# Patient Record
Sex: FEMALE | Race: WHITE | Employment: UNEMPLOYED | URBAN - METROPOLITAN AREA
[De-identification: names, ages, dates, MRNs, and addresses within clinical notes are randomized per-mention and may not be internally consistent; named-entity substitution may affect disease eponyms.]

---

## 2017-05-04 ENCOUNTER — OFFICE VISIT (OUTPATIENT)
Dept: BARIATRICS/WEIGHT MGMT | Age: 32
End: 2017-05-04

## 2017-05-04 VITALS
SYSTOLIC BLOOD PRESSURE: 125 MMHG | HEART RATE: 88 BPM | DIASTOLIC BLOOD PRESSURE: 73 MMHG | BODY MASS INDEX: 45.99 KG/M2 | HEIGHT: 67 IN | WEIGHT: 293 LBS

## 2017-05-04 DIAGNOSIS — I47.1 SVT (SUPRAVENTRICULAR TACHYCARDIA) (HCC): ICD-10-CM

## 2017-05-04 DIAGNOSIS — R00.2 PALPITATIONS: ICD-10-CM

## 2017-05-04 DIAGNOSIS — Z01.810 PREOP CARDIOVASCULAR EXAM: ICD-10-CM

## 2017-05-04 DIAGNOSIS — R06.83 SNORES: ICD-10-CM

## 2017-05-04 DIAGNOSIS — K59.09 CHRONIC CONSTIPATION: ICD-10-CM

## 2017-05-04 DIAGNOSIS — G89.29 CHRONIC BILATERAL LOW BACK PAIN WITH LEFT-SIDED SCIATICA: ICD-10-CM

## 2017-05-04 DIAGNOSIS — R07.89 OTHER CHEST PAIN: ICD-10-CM

## 2017-05-04 DIAGNOSIS — M54.42 CHRONIC BILATERAL LOW BACK PAIN WITH LEFT-SIDED SCIATICA: ICD-10-CM

## 2017-05-04 DIAGNOSIS — Z01.811 PREOP RESPIRATORY EXAM: ICD-10-CM

## 2017-05-04 DIAGNOSIS — E28.2 POLYCYSTIC DISEASE, OVARIES: ICD-10-CM

## 2017-05-04 DIAGNOSIS — K76.0 FATTY LIVER: ICD-10-CM

## 2017-05-04 DIAGNOSIS — G89.29 CHRONIC PAIN OF LEFT KNEE: ICD-10-CM

## 2017-05-04 DIAGNOSIS — E66.01 MORBID OBESITY WITH BMI OF 50.0-59.9, ADULT (HCC): Primary | ICD-10-CM

## 2017-05-04 DIAGNOSIS — K21.9 GASTROESOPHAGEAL REFLUX DISEASE WITHOUT ESOPHAGITIS: ICD-10-CM

## 2017-05-04 DIAGNOSIS — R06.02 SOBOE (SHORTNESS OF BREATH ON EXERTION): ICD-10-CM

## 2017-05-04 DIAGNOSIS — M25.562 CHRONIC PAIN OF LEFT KNEE: ICD-10-CM

## 2017-05-04 DIAGNOSIS — F41.9 ANXIETY: ICD-10-CM

## 2017-05-04 DIAGNOSIS — F41.0 PANIC: ICD-10-CM

## 2017-05-04 DIAGNOSIS — E66.01 MORBID OBESITY DUE TO EXCESS CALORIES (HCC): ICD-10-CM

## 2017-05-04 DIAGNOSIS — R06.02 SOB (SHORTNESS OF BREATH) ON EXERTION: ICD-10-CM

## 2017-05-04 PROCEDURE — 99215 OFFICE O/P EST HI 40 MIN: CPT | Performed by: SURGERY

## 2017-06-30 ENCOUNTER — OFFICE VISIT (OUTPATIENT)
Dept: BARIATRICS/WEIGHT MGMT | Age: 32
End: 2017-06-30

## 2017-06-30 VITALS
SYSTOLIC BLOOD PRESSURE: 123 MMHG | HEART RATE: 80 BPM | HEIGHT: 67 IN | WEIGHT: 293 LBS | BODY MASS INDEX: 45.99 KG/M2 | RESPIRATION RATE: 16 BRPM | DIASTOLIC BLOOD PRESSURE: 79 MMHG

## 2017-06-30 DIAGNOSIS — E66.01 MORBID OBESITY WITH BMI OF 50.0-59.9, ADULT (HCC): Primary | ICD-10-CM

## 2017-06-30 DIAGNOSIS — M54.42 CHRONIC BILATERAL LOW BACK PAIN WITH LEFT-SIDED SCIATICA: ICD-10-CM

## 2017-06-30 DIAGNOSIS — R06.02 SOBOE (SHORTNESS OF BREATH ON EXERTION): ICD-10-CM

## 2017-06-30 DIAGNOSIS — E28.2 POLYCYSTIC DISEASE, OVARIES: ICD-10-CM

## 2017-06-30 DIAGNOSIS — K76.0 FATTY LIVER: ICD-10-CM

## 2017-06-30 DIAGNOSIS — K21.9 GASTROESOPHAGEAL REFLUX DISEASE WITHOUT ESOPHAGITIS: ICD-10-CM

## 2017-06-30 DIAGNOSIS — R06.83 SNORES: ICD-10-CM

## 2017-06-30 DIAGNOSIS — F32.A DEPRESSION, UNSPECIFIED DEPRESSION TYPE: ICD-10-CM

## 2017-06-30 DIAGNOSIS — Z72.0 TOBACCO USE: ICD-10-CM

## 2017-06-30 DIAGNOSIS — G89.29 CHRONIC BILATERAL LOW BACK PAIN WITH LEFT-SIDED SCIATICA: ICD-10-CM

## 2017-06-30 PROCEDURE — 99214 OFFICE O/P EST MOD 30 MIN: CPT | Performed by: NURSE PRACTITIONER

## 2017-06-30 RX ORDER — FEEDER CONTAINER WITH PUMP SET
1 EACH MISCELLANEOUS DAILY
Qty: 32 CAN | Refills: 0 | Status: ON HOLD | OUTPATIENT
Start: 2017-06-30 | End: 2021-12-04

## 2017-06-30 ASSESSMENT — ENCOUNTER SYMPTOMS
TROUBLE SWALLOWING: 0
ABDOMINAL PAIN: 0
DIARRHEA: 0
RHINORRHEA: 0
WHEEZING: 0
ABDOMINAL DISTENTION: 0
BACK PAIN: 1
SHORTNESS OF BREATH: 1
EYE PAIN: 0
NAUSEA: 0
CHEST TIGHTNESS: 0

## 2017-07-02 ENCOUNTER — HOSPITAL ENCOUNTER (OUTPATIENT)
Dept: SLEEP CENTER | Age: 32
Discharge: HOME | End: 2017-07-02
Attending: INTERNAL MEDICINE | Admitting: INTERNAL MEDICINE

## 2017-08-04 ENCOUNTER — OFFICE VISIT (OUTPATIENT)
Dept: BARIATRICS/WEIGHT MGMT | Age: 32
End: 2017-08-04

## 2017-08-04 VITALS
DIASTOLIC BLOOD PRESSURE: 83 MMHG | HEART RATE: 83 BPM | SYSTOLIC BLOOD PRESSURE: 134 MMHG | WEIGHT: 293 LBS | RESPIRATION RATE: 16 BRPM | BODY MASS INDEX: 45.99 KG/M2 | HEIGHT: 67 IN

## 2017-08-04 DIAGNOSIS — E66.01 MORBID OBESITY WITH BMI OF 50.0-59.9, ADULT (HCC): Primary | ICD-10-CM

## 2017-08-04 DIAGNOSIS — M54.42 CHRONIC BILATERAL LOW BACK PAIN WITH LEFT-SIDED SCIATICA: ICD-10-CM

## 2017-08-04 DIAGNOSIS — G89.29 CHRONIC BILATERAL LOW BACK PAIN WITH LEFT-SIDED SCIATICA: ICD-10-CM

## 2017-08-04 DIAGNOSIS — K21.9 GASTROESOPHAGEAL REFLUX DISEASE WITHOUT ESOPHAGITIS: ICD-10-CM

## 2017-08-04 DIAGNOSIS — F32.A DEPRESSION, UNSPECIFIED DEPRESSION TYPE: ICD-10-CM

## 2017-08-04 DIAGNOSIS — E28.2 POLYCYSTIC DISEASE, OVARIES: ICD-10-CM

## 2017-08-04 DIAGNOSIS — M25.562 CHRONIC PAIN OF LEFT KNEE: ICD-10-CM

## 2017-08-04 DIAGNOSIS — F41.9 ANXIETY: ICD-10-CM

## 2017-08-04 DIAGNOSIS — G89.29 CHRONIC PAIN OF LEFT KNEE: ICD-10-CM

## 2017-08-04 DIAGNOSIS — R06.02 SOBOE (SHORTNESS OF BREATH ON EXERTION): ICD-10-CM

## 2017-08-04 PROCEDURE — 99214 OFFICE O/P EST MOD 30 MIN: CPT | Performed by: NURSE PRACTITIONER

## 2017-08-04 RX ORDER — VARENICLINE TARTRATE 1 MG/1
1 TABLET, FILM COATED ORAL DAILY
COMMUNITY
Start: 2017-07-27 | End: 2017-10-26

## 2017-08-04 RX ORDER — CALCIUM POLYCARBOPHIL 625 MG
1 TABLET ORAL DAILY
Qty: 30 TABLET | Refills: 2 | Status: SHIPPED | OUTPATIENT
Start: 2017-08-04 | End: 2017-10-26

## 2017-08-04 ASSESSMENT — ENCOUNTER SYMPTOMS
DIARRHEA: 0
NAUSEA: 0
COUGH: 0
SHORTNESS OF BREATH: 1
CHEST TIGHTNESS: 0
CHOKING: 0
APNEA: 0
WHEEZING: 0
ABDOMINAL PAIN: 0
TROUBLE SWALLOWING: 0
EYE PAIN: 0
BACK PAIN: 1
RHINORRHEA: 0
ABDOMINAL DISTENTION: 0

## 2017-09-15 ENCOUNTER — OFFICE VISIT (OUTPATIENT)
Dept: BARIATRICS/WEIGHT MGMT | Age: 32
End: 2017-09-15

## 2017-09-15 VITALS
WEIGHT: 293 LBS | BODY MASS INDEX: 50.89 KG/M2 | DIASTOLIC BLOOD PRESSURE: 96 MMHG | HEART RATE: 73 BPM | SYSTOLIC BLOOD PRESSURE: 139 MMHG

## 2017-09-15 DIAGNOSIS — E66.01 MORBID OBESITY WITH BMI OF 50.0-59.9, ADULT (HCC): Primary | ICD-10-CM

## 2017-09-15 DIAGNOSIS — K21.9 GASTROESOPHAGEAL REFLUX DISEASE WITHOUT ESOPHAGITIS: ICD-10-CM

## 2017-09-15 DIAGNOSIS — E28.2 POLYCYSTIC DISEASE, OVARIES: ICD-10-CM

## 2017-09-15 DIAGNOSIS — Z72.0 TOBACCO USE: ICD-10-CM

## 2017-09-15 DIAGNOSIS — F32.A DEPRESSION, UNSPECIFIED DEPRESSION TYPE: ICD-10-CM

## 2017-09-15 PROCEDURE — 99214 OFFICE O/P EST MOD 30 MIN: CPT | Performed by: NURSE PRACTITIONER

## 2017-09-15 ASSESSMENT — ENCOUNTER SYMPTOMS
EYE PAIN: 0
ABDOMINAL DISTENTION: 0
BACK PAIN: 1
SHORTNESS OF BREATH: 0
NAUSEA: 0
DIARRHEA: 0
ABDOMINAL PAIN: 0
WHEEZING: 0
TROUBLE SWALLOWING: 0
CHEST TIGHTNESS: 0
RHINORRHEA: 0

## 2017-10-25 ENCOUNTER — OFFICE VISIT (OUTPATIENT)
Dept: BARIATRICS/WEIGHT MGMT | Age: 32
End: 2017-10-25

## 2017-10-25 VITALS
DIASTOLIC BLOOD PRESSURE: 79 MMHG | HEIGHT: 67 IN | BODY MASS INDEX: 45.99 KG/M2 | WEIGHT: 293 LBS | SYSTOLIC BLOOD PRESSURE: 134 MMHG | HEART RATE: 78 BPM

## 2017-10-25 DIAGNOSIS — G89.29 CHRONIC BILATERAL LOW BACK PAIN WITH LEFT-SIDED SCIATICA: ICD-10-CM

## 2017-10-25 DIAGNOSIS — E66.01 MORBID OBESITY (HCC): ICD-10-CM

## 2017-10-25 DIAGNOSIS — K21.9 GASTROESOPHAGEAL REFLUX DISEASE WITHOUT ESOPHAGITIS: ICD-10-CM

## 2017-10-25 DIAGNOSIS — F41.0 PANIC: ICD-10-CM

## 2017-10-25 DIAGNOSIS — G89.29 CHRONIC PAIN OF LEFT KNEE: ICD-10-CM

## 2017-10-25 DIAGNOSIS — R00.2 PALPITATIONS: ICD-10-CM

## 2017-10-25 DIAGNOSIS — M25.562 CHRONIC PAIN OF LEFT KNEE: ICD-10-CM

## 2017-10-25 DIAGNOSIS — Z82.49 FHX: CORONARY ARTERY DISEASE: ICD-10-CM

## 2017-10-25 DIAGNOSIS — M23.91 INTERNAL DERANGEMENT OF RIGHT KNEE: ICD-10-CM

## 2017-10-25 DIAGNOSIS — E28.2 POLYCYSTIC DISEASE, OVARIES: ICD-10-CM

## 2017-10-25 DIAGNOSIS — F41.9 ANXIETY: Primary | ICD-10-CM

## 2017-10-25 DIAGNOSIS — M54.42 CHRONIC BILATERAL LOW BACK PAIN WITH LEFT-SIDED SCIATICA: ICD-10-CM

## 2017-10-25 DIAGNOSIS — Z92.89 H/O CARDIOVASCULAR STRESS TEST: ICD-10-CM

## 2017-10-25 DIAGNOSIS — R07.89 OTHER CHEST PAIN: ICD-10-CM

## 2017-10-25 DIAGNOSIS — F32.A DEPRESSION, UNSPECIFIED DEPRESSION TYPE: ICD-10-CM

## 2017-10-25 DIAGNOSIS — I47.1 SVT (SUPRAVENTRICULAR TACHYCARDIA) (HCC): ICD-10-CM

## 2017-10-25 DIAGNOSIS — R06.02 SOB (SHORTNESS OF BREATH) ON EXERTION: ICD-10-CM

## 2017-10-25 DIAGNOSIS — R06.02 SOBOE (SHORTNESS OF BREATH ON EXERTION): ICD-10-CM

## 2017-10-25 DIAGNOSIS — E66.01 MORBID OBESITY WITH BMI OF 50.0-59.9, ADULT (HCC): ICD-10-CM

## 2017-10-25 DIAGNOSIS — K59.09 CHRONIC CONSTIPATION: ICD-10-CM

## 2017-10-25 DIAGNOSIS — R06.83 SNORES: ICD-10-CM

## 2017-10-25 DIAGNOSIS — K76.0 FATTY LIVER: ICD-10-CM

## 2017-10-25 PROCEDURE — 99214 OFFICE O/P EST MOD 30 MIN: CPT | Performed by: SURGERY

## 2017-10-25 ASSESSMENT — ENCOUNTER SYMPTOMS
PHOTOPHOBIA: 0
TROUBLE SWALLOWING: 0
NAUSEA: 0
SORE THROAT: 0
BACK PAIN: 1
BLOOD IN STOOL: 0
VOICE CHANGE: 0
CONSTIPATION: 0
WHEEZING: 0
ABDOMINAL DISTENTION: 1
SHORTNESS OF BREATH: 1
ANAL BLEEDING: 0
COUGH: 0
DIARRHEA: 0
COLOR CHANGE: 0
ABDOMINAL PAIN: 1
VOMITING: 0

## 2017-10-25 NOTE — PROGRESS NOTES
exhibits no distension and no mass. There is no tenderness. There is no rebound and no guarding. Musculoskeletal: Normal range of motion. She exhibits no edema or tenderness. Lymphadenopathy:     She has no cervical adenopathy. Neurological: She is alert and oriented to person, place, and time. No cranial nerve deficit. Coordination normal.   Skin: Skin is warm and dry. No rash noted. She is not diaphoretic. No erythema. No pallor. Psychiatric: Her behavior is normal. Judgment and thought content normal.   Vitals reviewed. ASSESSMENT & PLAN:    1. Anxiety    2. Chronic bilateral low back pain with left-sided sciatica    3. Other chest pain    4. Depression, unspecified depression type    5. Fatty liver    6. Chronic constipation    7. FHx: coronary artery disease    8. Gastroesophageal reflux disease without esophagitis    9. H/O cardiovascular stress test    10. Internal derangement of right knee    11. Chronic pain of left knee    12. Morbid obesity ([address]    13. Morbid obesity with BMI of 50.0-59.9, adult ([address]    14. Palpitations    15. Panic    16. Polycystic disease, ovaries    17. Snores    18. SOB (shortness of breath) on exertion    19. SOBOE (shortness of breath on exertion)    20. SVT (supraventricular tachycardia) ([address]           2 cigarette last month almost quit counseled and has a beautiful positive attitude and promised that next month off - Psych referal next month. Cardiac and Pulm clearances were obtained and healthy no change since. GERD, morbid obesity: Body mass index is 49.96 kg/m². , in preparation for a bariatric procedure; will proceed with EGD to r/o GERD, Hiatal Hernia, Peptic Ulcer Disease, Gastroparesis, Esophageal dysmotility; etc.        Patient was encouraged to journal all food intake. Keep calorie level at approximately 7822-3007. Protein intake is to be a minimum of 60-80 grams per day. Water drinking was encouraged with a goal of 64oz-128oz daily.  Beverages to be

## 2017-10-26 ENCOUNTER — TELEPHONE (OUTPATIENT)
Dept: BARIATRICS/WEIGHT MGMT | Age: 32
End: 2017-10-26

## 2017-12-11 ENCOUNTER — OFFICE VISIT (OUTPATIENT)
Dept: BARIATRICS/WEIGHT MGMT | Age: 32
End: 2017-12-11

## 2017-12-11 VITALS
DIASTOLIC BLOOD PRESSURE: 88 MMHG | WEIGHT: 293 LBS | OXYGEN SATURATION: 98 % | HEART RATE: 72 BPM | HEIGHT: 67 IN | BODY MASS INDEX: 45.99 KG/M2 | SYSTOLIC BLOOD PRESSURE: 138 MMHG

## 2017-12-11 DIAGNOSIS — K21.9 GASTROESOPHAGEAL REFLUX DISEASE WITHOUT ESOPHAGITIS: ICD-10-CM

## 2017-12-11 DIAGNOSIS — Z72.0 TOBACCO ABUSE: ICD-10-CM

## 2017-12-11 DIAGNOSIS — A04.8 H. PYLORI INFECTION: ICD-10-CM

## 2017-12-11 DIAGNOSIS — E66.01 MORBID OBESITY WITH BMI OF 45.0-49.9, ADULT (HCC): Primary | ICD-10-CM

## 2017-12-11 PROCEDURE — G8484 FLU IMMUNIZE NO ADMIN: HCPCS | Performed by: NURSE PRACTITIONER

## 2017-12-11 PROCEDURE — 99214 OFFICE O/P EST MOD 30 MIN: CPT | Performed by: NURSE PRACTITIONER

## 2017-12-11 PROCEDURE — G8427 DOCREV CUR MEDS BY ELIG CLIN: HCPCS | Performed by: NURSE PRACTITIONER

## 2017-12-11 PROCEDURE — 4004F PT TOBACCO SCREEN RCVD TLK: CPT | Performed by: NURSE PRACTITIONER

## 2017-12-11 PROCEDURE — G8417 CALC BMI ABV UP PARAM F/U: HCPCS | Performed by: NURSE PRACTITIONER

## 2017-12-11 RX ORDER — CLARITHROMYCIN 500 MG/1
500 TABLET, COATED ORAL 2 TIMES DAILY
Qty: 28 TABLET | Refills: 0 | Status: SHIPPED | OUTPATIENT
Start: 2017-12-11 | End: 2017-12-25

## 2017-12-11 RX ORDER — PANTOPRAZOLE SODIUM 40 MG/1
40 TABLET, DELAYED RELEASE ORAL DAILY
Qty: 30 TABLET | Refills: 3 | Status: ON HOLD | OUTPATIENT
Start: 2017-12-11 | End: 2018-04-04 | Stop reason: HOSPADM

## 2017-12-11 RX ORDER — AMOXICILLIN 500 MG/1
1000 CAPSULE ORAL 2 TIMES DAILY
Qty: 56 CAPSULE | Refills: 0 | Status: SHIPPED | OUTPATIENT
Start: 2017-12-11 | End: 2017-12-25

## 2017-12-11 ASSESSMENT — ENCOUNTER SYMPTOMS
SHORTNESS OF BREATH: 0
BACK PAIN: 1
EYE DISCHARGE: 0
ABDOMINAL PAIN: 1
CONSTIPATION: 1
NAUSEA: 0
COUGH: 0
COLOR CHANGE: 0
EYE ITCHING: 0
CHEST TIGHTNESS: 0
TROUBLE SWALLOWING: 0
EYE PAIN: 0
VOMITING: 0
DIARRHEA: 0

## 2018-01-09 ENCOUNTER — TELEPHONE (OUTPATIENT)
Dept: BARIATRICS/WEIGHT MGMT | Age: 33
End: 2018-01-09

## 2018-01-22 ENCOUNTER — OFFICE VISIT (OUTPATIENT)
Dept: BARIATRICS/WEIGHT MGMT | Age: 33
End: 2018-01-22

## 2018-01-22 VITALS
WEIGHT: 293 LBS | BODY MASS INDEX: 45.99 KG/M2 | RESPIRATION RATE: 20 BRPM | DIASTOLIC BLOOD PRESSURE: 82 MMHG | SYSTOLIC BLOOD PRESSURE: 122 MMHG | HEART RATE: 109 BPM | HEIGHT: 67 IN

## 2018-01-22 DIAGNOSIS — K21.9 GASTROESOPHAGEAL REFLUX DISEASE WITHOUT ESOPHAGITIS: ICD-10-CM

## 2018-01-22 DIAGNOSIS — E66.01 MORBID OBESITY (HCC): Primary | ICD-10-CM

## 2018-01-22 DIAGNOSIS — G89.29 CHRONIC BILATERAL LOW BACK PAIN WITH LEFT-SIDED SCIATICA: ICD-10-CM

## 2018-01-22 DIAGNOSIS — M54.42 CHRONIC BILATERAL LOW BACK PAIN WITH LEFT-SIDED SCIATICA: ICD-10-CM

## 2018-01-22 PROCEDURE — 99213 OFFICE O/P EST LOW 20 MIN: CPT | Performed by: NURSE PRACTITIONER

## 2018-01-22 PROCEDURE — G8417 CALC BMI ABV UP PARAM F/U: HCPCS | Performed by: NURSE PRACTITIONER

## 2018-01-22 PROCEDURE — 1036F TOBACCO NON-USER: CPT | Performed by: NURSE PRACTITIONER

## 2018-01-22 PROCEDURE — G8484 FLU IMMUNIZE NO ADMIN: HCPCS | Performed by: NURSE PRACTITIONER

## 2018-01-22 PROCEDURE — G8427 DOCREV CUR MEDS BY ELIG CLIN: HCPCS | Performed by: NURSE PRACTITIONER

## 2018-01-22 ASSESSMENT — ENCOUNTER SYMPTOMS
CHEST TIGHTNESS: 0
ABDOMINAL DISTENTION: 0
CHOKING: 0
TROUBLE SWALLOWING: 0
WHEEZING: 0
COUGH: 1
ABDOMINAL PAIN: 0
DIARRHEA: 0
NAUSEA: 0
RHINORRHEA: 0
BACK PAIN: 1
SHORTNESS OF BREATH: 0
EYE PAIN: 0

## 2018-01-22 NOTE — PROGRESS NOTES
BARIATRIC SURGERY OFFICE NOTE    SUBJECTIVE:    Patient presenting today referred from Shanel Choudhury MD, for   Chief Complaint   Patient presents with   [employer]     7th WM visit, diet, exercise and pre-surgical weight loss. .    Vitals:    01/22/18 1412   BP: 122/82   Pulse: 109   Resp: 20        BMI: Body mass index is 48.98 kg/m². Obesity Classification: III Morbid Obesity. Weight History: Wt Readings from Last 3 Encounters:   01/22/18 (!) 312 lb 11.2 oz (141.8 kg)   12/11/17 (!) 316 lb 12.8 oz (143.7 kg)   10/26/17 (!) 318 lb (144.2 kg)       HPI: Linda Schultz is a 27 y.o. female presenting in seventh bariatric visit, follow up diet and exercise - pre-operative weight loss, in consideration for bariatric surgery. Total weight loss/gain -9 Lbs over 7 month. Patient is here for their seventh bariatric visit, follow up diet and exercise - pre-operative weight loss, in consideration for bariatric surgery. BMI: Body mass index is 48.98 kg/m². Obesity Classification: III Morbid Obesity. Weight History: Wt Readings from Last 3 Encounters:   01/22/18 (!) 312 lb 11.2 oz (141.8 kg)   12/11/17 (!) 316 lb 12.8 oz (143.7 kg)   10/26/17 (!) 318 lb (144.2 kg)     Total weight loss/gain -4.1 Lbs over 1 week. Patient dines out to a sit down restaurant 0 times per month. Patient eats fast food meals 0 times per month. Drinks mostly flavored water or diet tea    24 hour recall/food frequency chart: Narciso recovering from pneumonia x 3 weeks, unable to keep rani foods down. Breakfast: none  Snack: none  Lunch: chicken noodle soup  Snack: none  Dinner: none  Snack: none    Total daily calories: 3272-9818      Exercises: typically walking but between. Recently dx with pna but improving. On antibiotics. Weight loss good this month. Needs PT and lab work completed. Will have by next appt. And then possible consent.      Thoroughly reviewed the patient's medical history, family history, social history and review of systems with the patient today in the office. Please see medical record for pertinent positives. Past Medical History:   Diagnosis Date    Arthritis     bilat. knees    Chest pain 05/2014    \"no chest pain since 2014- they think the chest pain was related to pain I was having\"    Depressed 5/5/2016    FHx: coronary artery disease     H/O 24 hour EKG monitoring 5/25/14    24 hr holter monitor. Sinus rhythm.  H/O cardiovascular stress test 06/02/2014    EF 64%, no ischemia, no wall motion abnormality seen, normal perfusion pattern.  H/O echocardiogram 6/02/14 6/14 EF-55% left atrium is mildly dilated.     IBS (irritable bowel syndrome)     dx 2015    Morbid obesity (HCC)     Osteoarthritis     Palpitations 5/14    PTSD (post-traumatic stress disorder)     Severe anxiety     SOBOE (shortness of breath on exertion) 5/14    Stomach cramps, generalized     SVT (supraventricular tachycardia) ([address] 05/2014    \"saw heart dr had stress test, 48 hr holter and echo and all turned out fine\"      Patient Active Problem List   Diagnosis    Internal derangement of right knee    Morbid obesity ([address]    FHx: coronary artery disease    SVT (supraventricular tachycardia) (HCC)    Chest pain    SOBOE (shortness of breath on exertion)    Palpitations    H/O cardiovascular stress test    Snores    Fatty liver    Gastroesophageal reflux disease without esophagitis    Chronic constipation    SOB (shortness of breath) on exertion    Depressed    Anxiety    Panic    Polycystic disease, ovaries    Bilateral low back pain    Left knee pain    Morbid obesity with BMI of 50.0-59.9, adult ([address]    Hiatal hernia     Past Surgical History:   Procedure Laterality Date    CHOLECYSTECTOMY  2015    COLONOSCOPY  2015    DILATION AND CURETTAGE OF UTERUS  2011    HYSTERECTOMY  jan 2015    \"they left both ovaries\"    TONSILLECTOMY Bilateral 2/2016     Current

## 2018-01-22 NOTE — PROGRESS NOTES
Patient is here for their seventh bariatric visit, follow up diet and exercise - pre-operative weight loss, in consideration for bariatric surgery. BMI: Body mass index is 48.98 kg/m². Obesity Classification: III Morbid Obesity. Weight History: Wt Readings from Last 3 Encounters:   01/22/18 (!) 312 lb 11.2 oz (141.8 kg)   12/11/17 (!) 316 lb 12.8 oz (143.7 kg)   10/26/17 (!) 318 lb (144.2 kg)     Total weight loss/gain -4.1 Lbs over 1 week. Patient dines out to a sit down restaurant 0 times per month. Patient eats fast food meals 0 times per month. Drinks mostly flavored water or diet tea    24 hour recall/food frequency chart: Patinet recovering from pneumonia x 3 weeks, unable to keep rani foods down. Breakfast: none  Snack: none  Lunch: chicken noodle soup  Snack: none  Dinner: none  Snack: none    Total daily calories: 5547-5601      Exercises: typically walking but between the weather and being sick lately, not exercising regularly.

## 2018-02-15 LAB
A/G RATIO: NORMAL
ALBUMIN SERPL-MCNC: 4.2 G/DL
ALP BLD-CCNC: 116 U/L
ALT SERPL-CCNC: 62 U/L
AST SERPL-CCNC: 41 U/L
AVERAGE GLUCOSE: NORMAL
B-TYPE NATRIURETIC PEPTIDE: 7 PG/ML
BASOPHILS ABSOLUTE: 0.1 /ΜL
BASOPHILS RELATIVE PERCENT: 1 %
BILIRUB SERPL-MCNC: 0.4 MG/DL (ref 0.1–1.4)
BILIRUBIN DIRECT: 0.13 MG/DL
BILIRUBIN, INDIRECT: 0.4
BUN BLDV-MCNC: 13 MG/DL
CALCIUM SERPL-MCNC: 9.1 MG/DL
CHLORIDE BLD-SCNC: 101 MMOL/L
CHOLESTEROL, TOTAL: 179 MG/DL
CHOLESTEROL/HDL RATIO: 4.5
CO2: 101 MMOL/L
CREAT SERPL-MCNC: 1.01 MG/DL
EOSINOPHILS ABSOLUTE: 0.3 /ΜL
EOSINOPHILS RELATIVE PERCENT: 4 %
GFR CALCULATED: 72
GLOBULIN: NORMAL
GLUCOSE BLD-MCNC: 92 MG/DL
HBA1C MFR BLD: 4.7 %
HCT VFR BLD CALC: 44.5 % (ref 36–46)
HDLC SERPL-MCNC: 40 MG/DL (ref 35–70)
HEMOGLOBIN: 14.6 G/DL (ref 12–16)
LDL CHOLESTEROL CALCULATED: 111 MG/DL (ref 0–160)
LYMPHOCYTES ABSOLUTE: 4.2 /ΜL
LYMPHOCYTES RELATIVE PERCENT: 44 %
MCH RBC QN AUTO: 29.3 PG
MCHC RBC AUTO-ENTMCNC: 32.8 G/DL
MCV RBC AUTO: 89 FL
MONOCYTES ABSOLUTE: 0.4 /ΜL
MONOCYTES RELATIVE PERCENT: 4 %
NEUTROPHILS ABSOLUTE: 4.6 /ΜL
NEUTROPHILS RELATIVE PERCENT: 47 %
PDW BLD-RTO: 14.3 %
PLATELET # BLD: 371 K/ΜL
PMV BLD AUTO: NORMAL FL
POTASSIUM SERPL-SCNC: 4.2 MMOL/L
PROTEIN TOTAL: 7.1 G/DL
RBC # BLD: 4.99 10^6/ΜL
SODIUM BLD-SCNC: 140 MMOL/L
TRIGL SERPL-MCNC: 138 MG/DL
TSH SERPL DL<=0.05 MIU/L-ACNC: 2.47 UIU/ML
VLDLC SERPL CALC-MCNC: NORMAL MG/DL
WBC # BLD: 9.6 10^3/ML

## 2018-02-16 ENCOUNTER — OFFICE VISIT (OUTPATIENT)
Dept: BARIATRICS/WEIGHT MGMT | Age: 33
End: 2018-02-16

## 2018-02-16 VITALS
HEIGHT: 67 IN | HEART RATE: 90 BPM | DIASTOLIC BLOOD PRESSURE: 90 MMHG | BODY MASS INDEX: 45.99 KG/M2 | SYSTOLIC BLOOD PRESSURE: 126 MMHG | WEIGHT: 293 LBS

## 2018-02-16 DIAGNOSIS — E66.01 MORBID OBESITY WITH BMI OF 45.0-49.9, ADULT (HCC): Primary | ICD-10-CM

## 2018-02-16 PROCEDURE — G8484 FLU IMMUNIZE NO ADMIN: HCPCS | Performed by: SURGERY

## 2018-02-16 PROCEDURE — G8427 DOCREV CUR MEDS BY ELIG CLIN: HCPCS | Performed by: SURGERY

## 2018-02-16 PROCEDURE — 1036F TOBACCO NON-USER: CPT | Performed by: SURGERY

## 2018-02-16 PROCEDURE — G8417 CALC BMI ABV UP PARAM F/U: HCPCS | Performed by: SURGERY

## 2018-02-16 PROCEDURE — 99214 OFFICE O/P EST MOD 30 MIN: CPT | Performed by: SURGERY

## 2018-02-16 RX ORDER — OXYCODONE HYDROCHLORIDE 30 MG/1
15 TABLET ORAL PRN
Status: ON HOLD | COMMUNITY
Start: 2018-02-14 | End: 2021-12-04

## 2018-02-16 NOTE — PROGRESS NOTES
prazosin (MINIPRESS) 5 MG capsule Take 5 mg by mouth nightly      amphetamine-dextroamphetamine (ADDERALL, 30MG,) 30 MG tablet Take 30 mg by mouth 2 times daily as needed      ARIPiprazole (ABILIFY) 30 MG tablet Take 30 mg by mouth nightly       citalopram (CELEXA) 40 MG tablet Take 40 mg by mouth daily.  diazepam (VALIUM) 2 MG tablet   Take 10 mg by mouth 2 times daily        No current facility-administered medications for this visit. Allergies   Allergen Reactions    Phenergan [Promethazine Hcl] Anaphylaxis    Morphine      Itching, nausea & vomiting (severe)           Review of Systems      OBJECTIVE:    Vitals:    02/16/18 1335   BP: (!) 126/90   Pulse: 90        Physical Exam    October 27, 2017     PRE-OP DIAGNOSIS: GERD / Morbid obesity - Body mass index is 49.81 kg/m².     POST-OP DIAGNOSIS: Same +   Per the EGD performed all the way to the 3rd portion of the duodenum:  - Z-line was @ 38 cm from the superior incisors' level  - Some GERD stigmata noted, Small size sliding Hiatal Hernia noted, with mild GERD but no changes suspicious of Canales's  - Severe antral gastritis  - No peptic ulcer disease  - No biliary reflux        ASSESSMENT & PLAN:    1. Morbid obesity with BMI of 45.0-49.9, adult ([address]         As above will try to avoid knee replacement. On Percocet 30 mg 5 x /d. .. Patient was encouraged to journal all food intake. Keep calorie level at approximately 9061-3503. Protein intake is to be a minimum of 60-80 grams per day. Water drinking was encouraged with a goal of 64oz-128oz daily. Beverages to be calorie free except for milk. Every other beverage should be water. They are to avoid soda. Continue to increase level of physical activity. More than 50% of the office visit today (25 min) was spent in face to face counseling regarding diet and exercise, in preparation for her planned Robotic Sleeve Gastrectomy.   Counting calories, complying with the dietitian's

## 2018-02-26 DIAGNOSIS — Z01.811 PREOP RESPIRATORY EXAM: ICD-10-CM

## 2018-02-26 DIAGNOSIS — E66.01 MORBID OBESITY WITH BMI OF 50.0-59.9, ADULT (HCC): ICD-10-CM

## 2018-02-26 DIAGNOSIS — Z01.810 PREOP CARDIOVASCULAR EXAM: ICD-10-CM

## 2018-03-16 ENCOUNTER — OFFICE VISIT (OUTPATIENT)
Dept: BARIATRICS/WEIGHT MGMT | Age: 33
End: 2018-03-16

## 2018-03-16 VITALS
WEIGHT: 293 LBS | DIASTOLIC BLOOD PRESSURE: 66 MMHG | BODY MASS INDEX: 45.99 KG/M2 | HEART RATE: 82 BPM | SYSTOLIC BLOOD PRESSURE: 139 MMHG | HEIGHT: 67 IN

## 2018-03-16 DIAGNOSIS — E66.01 MORBID OBESITY WITH BMI OF 45.0-49.9, ADULT (HCC): Primary | ICD-10-CM

## 2018-03-16 PROCEDURE — 99999 PR OFFICE/OUTPT VISIT,PROCEDURE ONLY: CPT

## 2018-03-16 NOTE — PROGRESS NOTES
Outpatient Nutrition Counseling    REASON FOR VISIT: Pre-Op Diet Teaching     Chief Complaint:    Chief Complaint   Patient presents with    Weight Management       SUBJECTIVE:  Pt here to start 2 week liquid protein shake diet prior to scheduled Sleeve gastrectomy 4/2/18. Instructed on post-op diet progression, fluid/activity log and vitamins post-op. Pt voiced understanding. The patient is a 26 y.o. female being seen for morbid obesity, planning weight loss surgery; Crystal's, Height: 5' 7\" (170.2 cm), Weight: (!) 301 lb 12.8 oz (136.9 kg), Current Body mass index is 47.27 kg/m². The patient's PCP is Mel Kyle MD     Comorbid Conditions:  Significant diseases affecting this patient are   Past Medical History:   Diagnosis Date    Arthritis     bilat. knees    Chest pain 05/2014    \"no chest pain since 2014- they think the chest pain was related to pain I was having\"    Depressed 5/5/2016    FHx: coronary artery disease     H/O 24 hour EKG monitoring 5/25/14    24 hr holter monitor. Sinus rhythm.  H/O cardiovascular stress test 06/02/2014    EF 64%, no ischemia, no wall motion abnormality seen, normal perfusion pattern.  H/O echocardiogram 6/02/14 6/14 EF-55% left atrium is mildly dilated.  IBS (irritable bowel syndrome)     dx 2015    Morbid obesity (HCC)     Osteoarthritis     Palpitations 5/14    PTSD (post-traumatic stress disorder)     Severe anxiety     SOBOE (shortness of breath on exertion) 5/14    Stomach cramps, generalized     SVT (supraventricular tachycardia) ([address] 05/2014    \"saw heart dr had stress test, 48 hr holter and echo and all turned out fine\"   . Review of Systems - ROS  Otherwise per HPI. Allergies:   Allergies   Allergen Reactions    Phenergan [Promethazine Hcl] Anaphylaxis    Morphine      Itching, nausea & vomiting (severe)       Past Surgical History:  Past Surgical History:   Procedure Laterality Date    CHOLECYSTECTOMY  2015    COLONOSCOPY 2015    DILATION AND CURETTAGE OF UTERUS  2011    HYSTERECTOMY  jan 2015    \"they left both ovaries\"    TONSILLECTOMY Bilateral 2/2016       Family History:  Family History   Problem Relation Age of Onset    Coronary Art Dis Maternal Grandfather     Heart Attack Maternal Grandfather     Obesity Maternal Grandfather     High Blood Pressure Maternal Grandfather     Heart Disease Maternal Grandfather     Coronary Art Dis Paternal Grandmother     Heart Attack Paternal Grandmother     Obesity Paternal Grandmother     High Blood Pressure Paternal Grandmother     Diabetes Paternal Grandmother     Obesity Maternal Grandmother     High Blood Pressure Maternal Grandmother     Arthritis Maternal Grandmother     Obesity Paternal Grandfather     High Blood Pressure Paternal Grandfather     Diabetes Paternal Grandfather        Social History:  Social History     Social History    Marital status: Single     Spouse name: N/A    Number of children: N/A    Years of education: N/A     Occupational History    Not on file.      Social History Main Topics    Smoking status: Former Smoker     Packs/day: 0.25     Years: 10.00     Types: Cigarettes     Quit date: 12/29/2017    Smokeless tobacco: Never Used    Alcohol use 0.0 oz/week      Comment: rarely/average\"6 times per year\"    Drug use: No    Sexual activity: Not Currently     Other Topics Concern    Not on file     Social History Narrative    No narrative on file         OBJECTIVE:  Physical Exam   /66 (Site: Right Arm, Position: Sitting, Cuff Size: Large Adult)   Pulse 82   Ht 5' 7\" (1.702 m)   Wt (!) 301 lb 12.8 oz (136.9 kg)   LMP 05/24/2014   BMI 47.27 kg/m²        NUTRITION DIAGNOSIS: Overweight / Obesity   Problem: Increased adiposity compared to reference standard or established norms   Etiology: Excess intake compared to output over time   S/S: Ht: 67' Wt: 301.8 lbs BMI: 47.3    NUTRITION INTERVENTIONS:    Individualized treatment goals

## 2018-03-26 ENCOUNTER — TELEPHONE (OUTPATIENT)
Dept: BARIATRICS/WEIGHT MGMT | Age: 33
End: 2018-03-26

## 2018-03-28 ENCOUNTER — HOSPITAL ENCOUNTER (OUTPATIENT)
Dept: PREADMISSION TESTING | Age: 33
Discharge: HOME | End: 2018-03-28
Attending: SURGERY | Admitting: SURGERY

## 2018-03-28 VITALS
RESPIRATION RATE: 16 BRPM | SYSTOLIC BLOOD PRESSURE: 139 MMHG | HEART RATE: 88 BPM | BODY MASS INDEX: 45.99 KG/M2 | TEMPERATURE: 95.8 F | DIASTOLIC BLOOD PRESSURE: 90 MMHG | HEIGHT: 67 IN | OXYGEN SATURATION: 97 % | WEIGHT: 293 LBS

## 2018-03-28 LAB
ANION GAP SERPL CALCULATED.3IONS-SCNC: 10 MMOL/L (ref 4–16)
BUN BLDV-MCNC: 9 MG/DL (ref 6–23)
CALCIUM SERPL-MCNC: 9.5 MG/DL (ref 8.3–10.6)
CHLORIDE BLD-SCNC: 100 MMOL/L (ref 99–110)
CO2: 31 MMOL/L (ref 21–32)
CREAT SERPL-MCNC: 0.9 MG/DL (ref 0.6–1.1)
GFR AFRICAN AMERICAN: >60 ML/MIN/1.73M2
GFR NON-AFRICAN AMERICAN: >60 ML/MIN/1.73M2
GLUCOSE BLD-MCNC: 105 MG/DL (ref 70–99)
HCT VFR BLD CALC: 43.1 % (ref 37–47)
HEMOGLOBIN: 14.2 GM/DL (ref 12.5–16)
MCH RBC QN AUTO: 29.3 PG (ref 27–31)
MCHC RBC AUTO-ENTMCNC: 32.9 % (ref 32–36)
MCV RBC AUTO: 89 FL (ref 78–100)
PDW BLD-RTO: 12.7 % (ref 11.7–14.9)
PLATELET # BLD: 344 K/CU MM (ref 140–440)
PMV BLD AUTO: 9.4 FL (ref 7.5–11.1)
POTASSIUM SERPL-SCNC: 4.1 MMOL/L (ref 3.5–5.1)
RBC # BLD: 4.84 M/CU MM (ref 4.2–5.4)
SODIUM BLD-SCNC: 141 MMOL/L (ref 135–145)
WBC # BLD: 8.4 K/CU MM (ref 4–10.5)

## 2018-03-28 RX ORDER — HEPARIN SODIUM 5000 [USP'U]/ML
5000 INJECTION, SOLUTION INTRAVENOUS; SUBCUTANEOUS ONCE
Status: CANCELLED | OUTPATIENT
Start: 2018-04-02

## 2018-03-28 RX ORDER — DIAZEPAM 10 MG/1
5 TABLET ORAL PRN
COMMUNITY

## 2018-03-28 ASSESSMENT — PAIN DESCRIPTION - ONSET: ONSET: PROGRESSIVE

## 2018-03-28 ASSESSMENT — PAIN DESCRIPTION - PROGRESSION: CLINICAL_PROGRESSION: GRADUALLY WORSENING

## 2018-03-28 ASSESSMENT — PAIN DESCRIPTION - DESCRIPTORS: DESCRIPTORS: SHARP;ACHING;THROBBING

## 2018-03-28 ASSESSMENT — PAIN DESCRIPTION - LOCATION: LOCATION: BACK;KNEE

## 2018-03-28 ASSESSMENT — PAIN DESCRIPTION - ORIENTATION: ORIENTATION: RIGHT;LEFT

## 2018-03-28 ASSESSMENT — PAIN DESCRIPTION - FREQUENCY: FREQUENCY: CONTINUOUS

## 2018-03-28 ASSESSMENT — PAIN SCALES - GENERAL: PAINLEVEL_OUTOF10: 7

## 2018-03-28 ASSESSMENT — PAIN DESCRIPTION - PAIN TYPE: TYPE: CHRONIC PAIN

## 2018-03-30 ENCOUNTER — OFFICE VISIT (OUTPATIENT)
Dept: BARIATRICS/WEIGHT MGMT | Age: 33
End: 2018-03-30

## 2018-03-30 VITALS
HEIGHT: 67 IN | WEIGHT: 293 LBS | SYSTOLIC BLOOD PRESSURE: 154 MMHG | HEART RATE: 101 BPM | DIASTOLIC BLOOD PRESSURE: 84 MMHG | BODY MASS INDEX: 45.99 KG/M2

## 2018-03-30 DIAGNOSIS — E66.01 MORBID OBESITY WITH BMI OF 45.0-49.9, ADULT (HCC): Primary | ICD-10-CM

## 2018-03-30 PROCEDURE — 99999 PR OFFICE/OUTPT VISIT,PROCEDURE ONLY: CPT

## 2018-03-30 NOTE — PROGRESS NOTES
.    Review of Systems - ROS  Otherwise per HPI. Allergies: Allergies   Allergen Reactions    Morphine Itching and Nausea And Vomiting    Phenergan [Promethazine Hcl] Shortness Of Breath       Past Surgical History:  Past Surgical History:   Procedure Laterality Date    CHOLECYSTECTOMY, LAPAROSCOPIC  2015    COLONOSCOPY  2015    DENTAL SURGERY      Teeth Extracted In Past    DILATION AND CURETTAGE OF UTERUS  2011    ENDOSCOPY, COLON, DIAGNOSTIC  2017    EYE SURGERY Bilateral 1995    \"Tightened The Muscles, I Was Cross Eyed\"    HYSTERECTOMY VAGINAL  2000's    TONSILLECTOMY  2/2016       Family History:  Family History   Problem Relation Age of Onset    Depression Mother     Alcohol Abuse Father     Substance Abuse Father      Alcoholisim    High Blood Pressure Father     Bipolar Disorder Sister     Depression Sister     Substance Abuse Sister      \"Marijuana\"       Social History:  Social History     Social History    Marital status: Single     Spouse name: N/A    Number of children: N/A    Years of education: N/A     Occupational History    Not on file.      Social History Main Topics    Smoking status: Former Smoker     Packs/day: 0.50     Years: 20.00     Types: Cigarettes     Start date: 1997     Quit date: 2017    Smokeless tobacco: Never Used    Alcohol use 0.0 oz/week      Comment: \"Very Rarely, Maybe Once A Year\"    Drug use: No    Sexual activity: Not Currently     Other Topics Concern    Not on file     Social History Narrative    No narrative on file         OBJECTIVE:  Physical Exam   BP (!) 154/84 (Site: Right Arm, Position: Sitting, Cuff Size: Large Adult)   Pulse 101   Ht 5' 7\" (1.702 m)   Wt (!) 304 lb 3.2 oz (138 kg)   LMP 05/24/2014   BMI 47.64 kg/m²        NUTRITION DIAGNOSIS: Overweight / Obesity   Problem: Increased adiposity compared to reference standard or established norms   Etiology: Excess intake compared to output over time   S/S: Ht: 67\" Wt: 304.2 lbs

## 2018-04-09 ENCOUNTER — TELEPHONE (OUTPATIENT)
Dept: BARIATRICS/WEIGHT MGMT | Age: 33
End: 2018-04-09

## 2018-04-11 ENCOUNTER — OFFICE VISIT (OUTPATIENT)
Dept: BARIATRICS/WEIGHT MGMT | Age: 33
End: 2018-04-11

## 2018-04-11 VITALS
BODY MASS INDEX: 45.39 KG/M2 | DIASTOLIC BLOOD PRESSURE: 88 MMHG | HEIGHT: 67 IN | HEART RATE: 90 BPM | WEIGHT: 289.2 LBS | SYSTOLIC BLOOD PRESSURE: 126 MMHG

## 2018-04-11 DIAGNOSIS — Z98.84 STATUS POST LAPAROSCOPIC SLEEVE GASTRECTOMY: ICD-10-CM

## 2018-04-11 DIAGNOSIS — Z98.84 STATUS POST BARIATRIC SURGERY: Primary | ICD-10-CM

## 2018-04-11 PROCEDURE — 99024 POSTOP FOLLOW-UP VISIT: CPT | Performed by: SURGERY

## 2018-04-11 RX ORDER — BUPRENORPHINE 20 UG/H
PATCH TRANSDERMAL
Status: ON HOLD | COMMUNITY
End: 2021-12-04

## 2018-04-24 ENCOUNTER — OFFICE VISIT (OUTPATIENT)
Dept: BARIATRICS/WEIGHT MGMT | Age: 33
End: 2018-04-24

## 2018-04-24 VITALS
RESPIRATION RATE: 20 BRPM | WEIGHT: 283.7 LBS | HEIGHT: 67 IN | HEART RATE: 84 BPM | DIASTOLIC BLOOD PRESSURE: 80 MMHG | SYSTOLIC BLOOD PRESSURE: 120 MMHG | BODY MASS INDEX: 44.53 KG/M2

## 2018-04-24 DIAGNOSIS — R53.83 OTHER FATIGUE: ICD-10-CM

## 2018-04-24 DIAGNOSIS — E66.01 MORBID OBESITY WITH BMI OF 45.0-49.9, ADULT (HCC): Primary | ICD-10-CM

## 2018-04-24 DIAGNOSIS — K59.00 CONSTIPATION, UNSPECIFIED CONSTIPATION TYPE: ICD-10-CM

## 2018-04-24 DIAGNOSIS — Z98.84 STATUS POST LAPAROSCOPIC SLEEVE GASTRECTOMY: ICD-10-CM

## 2018-04-24 PROCEDURE — 99024 POSTOP FOLLOW-UP VISIT: CPT | Performed by: NURSE PRACTITIONER

## 2018-04-24 RX ORDER — POLYETHYLENE GLYCOL 3350 17 G/17G
17 POWDER, FOR SOLUTION ORAL DAILY
Qty: 510 G | Refills: 0 | Status: SHIPPED | OUTPATIENT
Start: 2018-04-24 | End: 2018-05-24

## 2018-04-24 ASSESSMENT — ENCOUNTER SYMPTOMS
NAUSEA: 0
SHORTNESS OF BREATH: 0
BACK PAIN: 1
DIARRHEA: 0
TROUBLE SWALLOWING: 0
ABDOMINAL PAIN: 0
CHEST TIGHTNESS: 0
WHEEZING: 0
RHINORRHEA: 0
ABDOMINAL DISTENTION: 0
EYE PAIN: 0

## 2018-05-09 ENCOUNTER — OFFICE VISIT (OUTPATIENT)
Dept: BARIATRICS/WEIGHT MGMT | Age: 33
End: 2018-05-09

## 2018-05-09 VITALS
BODY MASS INDEX: 43.29 KG/M2 | RESPIRATION RATE: 16 BRPM | WEIGHT: 275.8 LBS | HEART RATE: 84 BPM | SYSTOLIC BLOOD PRESSURE: 127 MMHG | HEIGHT: 67 IN | DIASTOLIC BLOOD PRESSURE: 93 MMHG

## 2018-05-09 DIAGNOSIS — Z98.84 STATUS POST BARIATRIC SURGERY: ICD-10-CM

## 2018-05-09 DIAGNOSIS — Z98.84 STATUS POST LAPAROSCOPIC SLEEVE GASTRECTOMY: Primary | ICD-10-CM

## 2018-05-09 PROCEDURE — 99024 POSTOP FOLLOW-UP VISIT: CPT | Performed by: SURGERY

## 2018-07-09 ENCOUNTER — OFFICE VISIT (OUTPATIENT)
Dept: BARIATRICS/WEIGHT MGMT | Age: 33
End: 2018-07-09

## 2018-07-09 VITALS
DIASTOLIC BLOOD PRESSURE: 74 MMHG | HEART RATE: 82 BPM | BODY MASS INDEX: 40.12 KG/M2 | RESPIRATION RATE: 18 BRPM | HEIGHT: 67 IN | WEIGHT: 255.6 LBS | SYSTOLIC BLOOD PRESSURE: 115 MMHG

## 2018-07-09 DIAGNOSIS — E43 SEVERE PROTEIN-CALORIE MALNUTRITION (HCC): ICD-10-CM

## 2018-07-09 DIAGNOSIS — E66.01 MORBID OBESITY (HCC): Primary | ICD-10-CM

## 2018-07-09 DIAGNOSIS — E44.1 MILD PROTEIN-CALORIE MALNUTRITION (HCC): ICD-10-CM

## 2018-07-09 PROCEDURE — 99213 OFFICE O/P EST LOW 20 MIN: CPT | Performed by: NURSE PRACTITIONER

## 2018-07-09 PROCEDURE — 1036F TOBACCO NON-USER: CPT | Performed by: NURSE PRACTITIONER

## 2018-07-09 PROCEDURE — G8427 DOCREV CUR MEDS BY ELIG CLIN: HCPCS | Performed by: NURSE PRACTITIONER

## 2018-07-09 PROCEDURE — G8417 CALC BMI ABV UP PARAM F/U: HCPCS | Performed by: NURSE PRACTITIONER

## 2018-07-09 ASSESSMENT — ENCOUNTER SYMPTOMS
ABDOMINAL DISTENTION: 0
NAUSEA: 0
ABDOMINAL PAIN: 0
TROUBLE SWALLOWING: 0
EYE PAIN: 0
DIARRHEA: 0
CHEST TIGHTNESS: 0
RHINORRHEA: 0
WHEEZING: 0
SHORTNESS OF BREATH: 0

## 2018-07-09 NOTE — PROGRESS NOTES
problem. Skin: Negative for rash. Allergic/Immunologic: Negative for environmental allergies. Neurological: Negative for dizziness, seizures and syncope. Hematological: Does not bruise/bleed easily. Psychiatric/Behavioral: Negative for behavioral problems and suicidal ideas. Physical Exam   Constitutional: She is oriented to person, place, and time. She appears well-developed and well-nourished. Obese   HENT:   Head: Normocephalic and atraumatic. Right Ear: Hearing and ear canal normal.   Left Ear: Hearing and ear canal normal.   Nose: Nose normal.   Mouth/Throat: Uvula is midline and oropharynx is clear and moist.   Eyes: Conjunctivae are normal. Pupils are equal, round, and reactive to light. Corrective lenses. Neck: Normal range of motion. Cardiovascular: Normal rate, regular rhythm and normal heart sounds. Pulmonary/Chest: Effort normal and breath sounds normal. She has no decreased breath sounds. She has no wheezes. She has no rhonchi. She has no rales. Abdominal: Soft. Bowel sounds are normal. There is no tenderness. Incisions all well healed. Musculoskeletal: Normal range of motion. She exhibits no tenderness. In all 4 extremities. Neurological: She is alert and oriented to person, place, and time. She has normal strength. She exhibits normal muscle tone. GCS eye subscore is 4. GCS verbal subscore is 5. GCS motor subscore is 6. Skin: Skin is warm and dry. No rash noted. Psychiatric: She has a normal mood and affect. Her behavior is normal. Judgment normal.   Nursing note and vitals reviewed. Assessment / Plan:    1. Morbid obesity ([address]  - Doing very well with weight loss. - Instructed to increase protein intake to at least 50 grams per day, discussed protein sources etc.   - Keep good hydration. - weight loss is great. - CBC Auto Differential; Future  - Comprehensive Metabolic Panel; Future  - Lipid Panel; Future  - PTH, Intact;  Future  - TSH with Reflex; Future  - Vitamin B1; Future  - Vitamin B12 & Folate; Future  - Vitamin D 25 Hydroxy; Future  - Zinc; Future  - Comprehensive Metabolic Panel; Future  - Calories should be around 800, continue activity. - Call with any questions or concerns. - Follow up with Dr. Corrine Dupree in 3 months. - Instructed on repeat labs, will call with results. 2. Mild protein-calorie malnutrition ([address]   - need to check protein level.   - TSH with Reflex; Future    3. Severe protein-calorie malnutrition ([address]   - needs rechecked for any deficiencies. - Continue MVI. - Vitamin D 25 Hydroxy; Future      No orders of the defined types were placed in this encounter. Orders Placed This Encounter   Procedures    CBC Auto Differential     Standing Status:   Future     Standing Expiration Date:   7/9/2019    Comprehensive Metabolic Panel     Standing Status:   Future     Standing Expiration Date:   7/9/2019    Lipid Panel     Standing Status:   Future     Standing Expiration Date:   7/9/2019     Order Specific Question:   Is Patient Fasting?/# of Hours     Answer:   12    PTH, Intact     Standing Status:   Future     Standing Expiration Date:   7/9/2019    TSH with Reflex     Standing Status:   Future     Standing Expiration Date:   7/9/2019    Vitamin B1     Standing Status:   Future     Standing Expiration Date:   7/9/2019    Vitamin B12 & Folate     Standing Status:   Future     Standing Expiration Date:   7/9/2019    Vitamin D 25 Hydroxy     Standing Status:   Future     Standing Expiration Date:   7/9/2019    Zinc     Standing Status:   Future     Standing Expiration Date:   7/9/2019    Comprehensive Metabolic Panel     Standing Status:   Future     Standing Expiration Date:   7/9/2019       Follow Up:  Return in about 2 months (around 9/9/2018).     Noah Grewal, CNP

## 2018-07-23 ENCOUNTER — HOSPITAL ENCOUNTER (OUTPATIENT)
Dept: PHYSICAL THERAPY | Age: 33
Discharge: HOME | End: 2018-07-31
Attending: FAMILY MEDICINE | Admitting: FAMILY MEDICINE

## 2018-07-23 ASSESSMENT — PAIN DESCRIPTION - DESCRIPTORS: DESCRIPTORS: ACHING;TIGHTNESS;STABBING

## 2018-07-23 ASSESSMENT — PAIN DESCRIPTION - ORIENTATION: ORIENTATION: RIGHT;LEFT

## 2018-07-23 ASSESSMENT — PAIN DESCRIPTION - LOCATION: LOCATION: BACK;KNEE

## 2018-07-23 ASSESSMENT — PAIN DESCRIPTION - FREQUENCY: FREQUENCY: INTERMITTENT

## 2018-07-23 NOTE — FLOWSHEET NOTE
Physical Therapy Daily Treatment Note  Date:  2018    Patient Name:  Susan Fortune    :  1985  MRN: [id number]      Diagnosis: Back pain 724.5, knee pain 719.46  Treatment Diagnosis: Chronic LB and L knee pain; crepitus L knee; decreased gait tolerance and endurance; valgus deformity of L knee; decreased Lumbar SB to R; weakness in knees, L >R  PT Insurance Information: [address] [place] [address]  Referring Practitioner: Francisca Acuna  Referring Practitioner Follow-Up:     POC Signed: pending  POC Date Range:    Progress Note Due:    Visit# / total visits:  AnnabelsPriyank needs preauthorization               Goals:  Short term goals  Time Frame for Short term goals: 6 wks. Short term goal 1: Ind with HEP   Short term goal 2: LEFS 1580  Short term goal 3: Pt. will tolerate 25 minutes of walking    G-Code  PT G-Codes  Functional Limitation: Mobility: Walking and moving around  Mobility: Walking and Moving Around Current Status (): At least 80 percent but less than 100 percent impaired, limited or restricted  Mobility: Walking and Moving Around Goal Status (): At least 60 percent but less than 80 percent impaired, limited or restricted       Summary of Eval:       INITIAL PAIN LEVEL:  /10   SUBJECTIVE:      Any changes to Ambulatory Summary Sheet? Any major status changes?      Skilled PT activities: Date 18  #1 Date Date Date   Outcome measure  LEFS 20/80       Aquatics                                                                                                                       HEP:       Supervision/Cues:        Objectives:       Response to intervention:       Overall change since Evaluation:       Prognosis:       Plan for Next Session:         Intervention used today:  Eval  [] Therapeutic Exercise    [] Therapeutic Activity     [] Ultrasound  [] Elec  Stim  [] Gait Training      [] Cervical Traction [] Lumbar Traction  [] Neuromuscular Re-education    [] Cold/hotpack []

## 2018-07-23 NOTE — PLAN OF CARE
Training     [] Cervical Traction [] Lumbar Traction  [] Neuromuscular Re-education [] Cold/hotpack [] Iontophoresis   [] Instruction in HEP       [] Manual Therapy     [] vasopneumatic            [] Self care home management        []Dry needling trigger point point/pain management      Plan of Care Date Range:      ? Frequency/Duration:  # Days per week: [] 1 day # Weeks: [] 1 week [] 5 weeks     [x] 2 days? [] 2 weeks [x] 6 weeks     [] 3 days   [] 3 weeks [] 7 weeks     [] 4 days   [] 4 weeks [] 8 weeks    Rehab Potential: [] Excellent [x] Good [x] Fair  [] Poor     Goals:  Short term goals  Time Frame for Short term goals: 6 wks. Short term goal 1: Ind with HEP   Short term goal 2: LEFS 15/80  Short term goal 3: Pt. will tolerate 25 minutes of walking       Electronically signed by:  Angelia Collet, PT, 7/23/2018, 4:53 PM          If you have any questions or concerns, please don't hesitate to call.   Thank you for your referral.    Physician Signature:_________________Date:____________Time: ________  By signing above, therapists plan is approved by physician

## 2018-07-23 NOTE — PROGRESS NOTES
(Mother)  Type of Home: House  Home Layout: One level  Home Access:  (2 steps to enter)  Bathroom Shower/Tub: Tub only  Bathroom Toilet: Standard  ADL Assistance: Independent  Homemaking Assistance: Independent  Homemaking Responsibilities: Yes  Ambulation Assistance: Independent  Transfer Assistance: Independent  Active : Yes  Mode of Transportation: Car  Occupation: Unemployed  Objective     Observation/Palpation  Posture: Fair  Observation: Genu valgus deformity L Knee; pt overweight    AROM RLE (degrees)  RLE AROM: WFL  AROM LLE (degrees)  LLE AROM : WFL  Spine  Lumbar: Standing: FF: lordosis reverses; BB: WFL with central LB pain; SB: to L is WFL; SB to R: range 1/2 that of L SB. Special Tests: DTRs: L4 and S1: can't evoke abbe. Strength RLE  Comment: Grossly 3+5 or better at hip and knee; ankle 4/5 or better  Strength LLE  Comment: Grossly 3+/5 or better at LB and 3/5 knee ext, and 4/5 knee flexion; 4/5 at ankle  Tone RLE  RLE Tone: Normotonic  Tone LLE  LLE Tone: Normotonic  Motor Control  Gross Motor?: WNL     Sensation  Overall Sensation Status: WNL     Transfers  Sit to Stand: Independent  Stand to sit:  Independent  Ambulation  Ambulation?: Yes  Ambulation 1  Surface: carpet  Device: No Device  Quality of Gait: Mild scissoring of L knee in front of R leg     Exercises  Exercise 1: See daily flowsheet                      Assessment  Patient presents with chronic LB pain and L knee pain, which impacts on stand and gait tolerance and stair amb;patient's goal is to decrease pain and improve endurance in Knees and LB ;patient reports that pain  limits activities including those noted; PT to address patient's goals, impairments and activity limitations with skilled interventions checked in plan of care;patient's level of function prior limited to LB and knee pain; did not observe any barriers to learning during PT eval; learning preferences include demonstration, practice, and handouts; ; patient

## 2018-07-29 DIAGNOSIS — E66.01 MORBID OBESITY WITH BMI OF 50.0-59.9, ADULT (HCC): ICD-10-CM

## 2018-07-29 DIAGNOSIS — K21.9 GASTROESOPHAGEAL REFLUX DISEASE WITHOUT ESOPHAGITIS: ICD-10-CM

## 2018-07-30 RX ORDER — PANTOPRAZOLE SODIUM 40 MG/1
TABLET, DELAYED RELEASE ORAL
Qty: 60 TABLET | Refills: 2 | OUTPATIENT
Start: 2018-07-30

## 2018-08-01 ENCOUNTER — HOSPITAL ENCOUNTER (OUTPATIENT)
Dept: OTHER | Age: 33
Discharge: HOME | End: 2018-08-31
Attending: FAMILY MEDICINE | Admitting: FAMILY MEDICINE

## 2018-08-17 ENCOUNTER — TELEPHONE (OUTPATIENT)
Dept: SURGERY | Age: 33
End: 2018-08-17

## 2018-08-17 NOTE — TELEPHONE ENCOUNTER
Phoned patient and left a voicemail to call our office to reschedule her appointment on 10/10/18 due to Dr. Bird Pina not being in the office.

## 2018-09-01 ENCOUNTER — HOSPITAL ENCOUNTER (OUTPATIENT)
Dept: OTHER | Age: 33
Discharge: HOME | End: 2018-09-01
Attending: FAMILY MEDICINE | Admitting: FAMILY MEDICINE

## 2018-10-31 ENCOUNTER — OFFICE VISIT (OUTPATIENT)
Dept: BARIATRICS/WEIGHT MGMT | Age: 33
End: 2018-10-31
Payer: COMMERCIAL

## 2018-10-31 VITALS
HEIGHT: 67 IN | WEIGHT: 258.2 LBS | SYSTOLIC BLOOD PRESSURE: 98 MMHG | BODY MASS INDEX: 40.53 KG/M2 | DIASTOLIC BLOOD PRESSURE: 72 MMHG | HEART RATE: 80 BPM | RESPIRATION RATE: 14 BRPM

## 2018-10-31 DIAGNOSIS — Z98.84 STATUS POST LAPAROSCOPIC SLEEVE GASTRECTOMY: ICD-10-CM

## 2018-10-31 DIAGNOSIS — Z98.84 STATUS POST BARIATRIC SURGERY: Primary | ICD-10-CM

## 2018-10-31 PROCEDURE — G8417 CALC BMI ABV UP PARAM F/U: HCPCS | Performed by: SURGERY

## 2018-10-31 PROCEDURE — 99214 OFFICE O/P EST MOD 30 MIN: CPT | Performed by: SURGERY

## 2018-10-31 PROCEDURE — G8427 DOCREV CUR MEDS BY ELIG CLIN: HCPCS | Performed by: SURGERY

## 2018-10-31 PROCEDURE — 1036F TOBACCO NON-USER: CPT | Performed by: SURGERY

## 2018-10-31 PROCEDURE — G8484 FLU IMMUNIZE NO ADMIN: HCPCS | Performed by: SURGERY

## 2018-10-31 RX ORDER — BUPROPION HYDROCHLORIDE 300 MG/1
300 TABLET ORAL EVERY MORNING
COMMUNITY
End: 2022-02-09

## 2018-10-31 RX ORDER — NYSTATIN 100000 [USP'U]/G
POWDER TOPICAL
Qty: 1 BOTTLE | Refills: 5 | Status: SHIPPED | OUTPATIENT
Start: 2018-10-31 | End: 2022-02-09

## 2018-10-31 ASSESSMENT — ENCOUNTER SYMPTOMS
SORE THROAT: 0
NAUSEA: 0
VOMITING: 0
WHEEZING: 0
PHOTOPHOBIA: 0
COLOR CHANGE: 0
ANAL BLEEDING: 0
TROUBLE SWALLOWING: 0
BLOOD IN STOOL: 0
VOICE CHANGE: 0
SHORTNESS OF BREATH: 0
CONSTIPATION: 0
COUGH: 0
ABDOMINAL PAIN: 0
DIARRHEA: 0

## 2018-10-31 NOTE — PROGRESS NOTES
taking protein supplement: No.  Brand of Supplement:   Patient taking multivitamins: Yes  Does patient exercise: daily  What prevents you from exercising: knee and back issues    Addressed the status of the following co-morbidities:   1. GERD  GONE cured. 2. Depression  improving. 3. Arthritis  improving. Current Outpatient Prescriptions:     buPROPion (WELLBUTRIN XL) 300 MG extended release tablet, Take 300 mg by mouth every morning, Disp: , Rfl:     buprenorphine 20 MCG/HR PTWK, Place onto the skin. ., Disp: , Rfl:     pantoprazole (PROTONIX) 40 MG tablet, Take 1 tablet by mouth 2 times daily, Disp: 60 tablet, Rfl: 3    ondansetron (ZOFRAN ODT) 4 MG disintegrating tablet, Take 1 tablet by mouth every 4 hours as needed for Nausea or Vomiting, Disp: 30 tablet, Rfl: 3    diazepam (VALIUM) 10 MG tablet, Take 10 mg by mouth as needed for Anxiety. , Disp: , Rfl:     oxyCODONE (OXY-IR) 30 MG immediate release tablet, Take 15 mg by mouth as needed. Prn., Disp: , Rfl:     Nutritional Supplements (ENSURE HIGH PROTEIN) LIQD, Take 1 Can by mouth daily, Disp: 32 Can, Rfl: 0    prazosin (MINIPRESS) 5 MG capsule, Take 5 mg by mouth nightly, Disp: , Rfl:     ARIPiprazole (ABILIFY) 30 MG tablet, Take 30 mg by mouth nightly , Disp: , Rfl:     citalopram (CELEXA) 40 MG tablet, Take 40 mg by mouth nightly , Disp: , Rfl:   Past Medical History:   Diagnosis Date    Acid reflux     Anxiety     Arthritis     \"Knees\"    Chronic back pain     Chronic knee pain     COPD [facility]chronic obstructive [facility] disease) [facility])     Sees Dr. Cherilyn Aschoff    Depression     FHx: coronary artery disease     H/O 24 hour EKG monitoring 5/25/14    24 hr holter monitor. Sinus rhythm.  H/O cardiovascular stress test 06/02/2014    EF 64%, no ischemia, no wall motion abnormality seen, normal perfusion pattern.  H/O echocardiogram 6/02/14 6/14 EF-55% left atrium is mildly dilated.     Hiatal hernia     IBS (irritable bowel

## 2020-02-17 ENCOUNTER — HOSPITAL ENCOUNTER (OUTPATIENT)
Age: 35
Setting detail: SPECIMEN
Discharge: HOME | End: 2020-02-17
Payer: MEDICARE

## 2020-02-17 LAB
CRYSTALS, FLUID: NORMAL
FLUID TYPE: NORMAL INDEX
LYMPHOCYTES, BODY FLUID: 72 %
MONOCYTE, FLUID: 16 %
NEUTROPHIL, FLUID: 12 %
RBC FLUID: 644 /CU MM
WBC FLUID: 152 /CU MM

## 2020-02-17 PROCEDURE — 89060 EXAM SYNOVIAL FLUID CRYSTALS: CPT

## 2020-02-17 PROCEDURE — 89051 BODY FLUID CELL COUNT: CPT

## 2020-02-17 PROCEDURE — 87205 SMEAR GRAM STAIN: CPT

## 2020-02-17 PROCEDURE — 87071 CULTURE AEROBIC QUANT OTHER: CPT

## 2020-02-17 PROCEDURE — 87073 CULTURE BACTERIA ANAEROBIC: CPT

## 2020-02-20 LAB
CULTURE: NORMAL
GRAM SMEAR: NORMAL
Lab: NORMAL
SPECIMEN: NORMAL

## 2020-02-22 ENCOUNTER — HOSPITAL ENCOUNTER (EMERGENCY)
Age: 35
Discharge: HOME | End: 2020-02-22
Payer: MEDICARE

## 2020-02-22 VITALS
BODY MASS INDEX: 40.81 KG/M2 | TEMPERATURE: 98.4 F | SYSTOLIC BLOOD PRESSURE: 136 MMHG | DIASTOLIC BLOOD PRESSURE: 84 MMHG | OXYGEN SATURATION: 98 % | HEIGHT: 67 IN | HEART RATE: 78 BPM | RESPIRATION RATE: 18 BRPM | WEIGHT: 260 LBS

## 2020-02-22 PROCEDURE — 99282 EMERGENCY DEPT VISIT SF MDM: CPT

## 2020-02-22 RX ORDER — CEPHALEXIN 500 MG/1
500 CAPSULE ORAL 4 TIMES DAILY
Qty: 40 CAPSULE | Refills: 0 | Status: SHIPPED | OUTPATIENT
Start: 2020-02-22 | End: 2020-03-03

## 2020-02-22 NOTE — ED PROVIDER NOTES
eMERGENCY dEPARTMENT eNCOUnter      PCP: Gilberto Aponte MD    CHIEF COMPLAINT    Chief Complaint   Patient presents with    Other     lump/red on back of scalp         HPI    Molly Sylvester is a 38 y.o. female who presents with redness of the skin located to left posterior scalp since this morning. Patient states area feels raised, warm and is tender to touch. She denies any drainage from this area. No associated fevers, nausea or vomiting. No other associated symptoms. Patient states that she was diagnosed with shingles to the right side of her scalp 2 weeks ago and has since finished course of antiviral medications and has had complete resolution of the symptoms. She states that area was seeping and more painful. 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 REVIEW OF SYSTEMS    Constitutional: denies fever, chills  Respiratory:  No cough or shortness of breath   Cardiovascular:  No chest pain  GI: No nausea, vomiting  Musculoskeletal:  See HPI   Skin:  See HPI      All other review of systems are negative  See HPI and nursing notes for additional information     PAST MEDICAL HISTORY/SURGICAL HISTORY     Past Medical History:   Diagnosis Date    Acid reflux     Anxiety     Arthritis     \"Knees\"    Chronic back pain     Chronic knee pain     COPD (chronic obstructive pulmonary disease) ([facility])     Sees Dr. Marie Greenwood    Depression     FHx: coronary artery disease     H/O 24 hour EKG monitoring 5/25/14    24 hr holter monitor. Sinus rhythm.  H/O cardiovascular stress test 06/02/2014    EF 64%, no ischemia, no wall motion abnormality seen, normal perfusion pattern.  H/O echocardiogram 6/02/14 6/14 EF-55% left atrium is mildly dilated.     Hiatal hernia     IBS (irritable bowel syndrome)     Morbid obesity ([facility])     Night terrors     Osteoarthritis     \"Knees\"    Panic attacks     PTSD 98%   BMI 40.72 kg/m²    Constitutional:  Well developed, Well nourished  HENT:  Atraumatic, Normocephalic, PERRL, no eye drainage noted, bilateral external ears normal, no sinus tenderness, nose normal, oropharynx moist, no pharyngeal exudates. Neck- supple. No adenopathy. Respiratory:  No retractions, lungs are clear   Cardiovascular:  Heart rate regular, no JVD  GI:  Soft, No tenderness   Musculoskeletal:  No acute bony deformity, no obvious joint effusion   Integument:  No cyanosis, Indurated macular erythema with indistinct borders located to left posterior scalp, approximately 3.5 x 2 cm. Some flaking of scalp noted. No palpable fluctuance or drainage. No streaking of erythema. Mild tenderness over this area. Vascular: Dorsalis pedis pulses 2+ equal bilaterally  Neurologic:  Alert & oriented, no slurred speech. ED COURSE & MEDICAL DECISION MAKING       Vital signs and nursing notes reviewed during ED course. I have independently evaluated this patient. Supervising MD present in the Emergency Department, available for consultation, throughout entirety of  patient care. Patient presents as above. She is hemodynamically stable, afebrile on arrival and overall very well-appearing. Patch of indurated, macular erythema noted to back of left side of scalp. No palpable fluctuance. History exam most consistent with possible early cellulitic infection, do not see or palpate any abscess formation at this time. No vesicles or other exam findings concerning for shingles. We will plan on treating with Keflex and have patient follow-up in the next 2 days for recheck of this area. Patient to return with any new or worsening symptoms including systemic signs of illness. She is agreeable with this plan and comfortable with discharge. Diagnosis and plan discussed in detail with patient who understands and agrees. Patient agrees to follow up with PCP  in the next 2-3 days.  Patient agrees to return emergency department if symptoms worsen or any new symptoms develop, including but not limited to fever, chills, spreading redness, discharge from affected area, nausea, vomiting. Clinical  IMPRESSION    1. Cellulitis of head except face             Comment: Please note this report has been produced using speech recognition software and may contain errors related to that system including errors in grammar, punctuation, and spelling, as well as words and phrases that may be inappropriate. If there are any questions or concerns please feel free to contact the dictating provider for clarification.         LYNETTE Vyas  02/22/20 [id number]

## 2021-12-03 ENCOUNTER — HOSPITAL ENCOUNTER (INPATIENT)
Age: 36
LOS: 1 days | Discharge: HOME | DRG: 342 | End: 2021-12-05
Attending: STUDENT IN AN ORGANIZED HEALTH CARE EDUCATION/TRAINING PROGRAM | Admitting: STUDENT IN AN ORGANIZED HEALTH CARE EDUCATION/TRAINING PROGRAM
Payer: COMMERCIAL

## 2021-12-03 DIAGNOSIS — K52.9 ENTEROCOLITIS: ICD-10-CM

## 2021-12-03 DIAGNOSIS — K37 APPENDICITIS, UNSPECIFIED APPENDICITIS TYPE: Primary | ICD-10-CM

## 2021-12-03 PROCEDURE — 83690 ASSAY OF LIPASE: CPT

## 2021-12-03 PROCEDURE — 80053 COMPREHEN METABOLIC PANEL: CPT

## 2021-12-03 PROCEDURE — 81001 URINALYSIS AUTO W/SCOPE: CPT

## 2021-12-03 PROCEDURE — 85025 COMPLETE CBC W/AUTO DIFF WBC: CPT

## 2021-12-03 ASSESSMENT — PAIN SCALES - GENERAL: PAINLEVEL_OUTOF10: 10

## 2021-12-03 ASSESSMENT — PAIN DESCRIPTION - LOCATION: LOCATION: ABDOMEN

## 2021-12-03 ASSESSMENT — PAIN DESCRIPTION - PAIN TYPE: TYPE: ACUTE PAIN

## 2021-12-04 ENCOUNTER — ANESTHESIA (OUTPATIENT)
Dept: OPERATING ROOM | Age: 36
DRG: 342 | End: 2021-12-04
Payer: COMMERCIAL

## 2021-12-04 ENCOUNTER — APPOINTMENT (OUTPATIENT)
Dept: CT IMAGING | Age: 36
DRG: 342 | End: 2021-12-04
Payer: COMMERCIAL

## 2021-12-04 ENCOUNTER — ANESTHESIA EVENT (OUTPATIENT)
Dept: OPERATING ROOM | Age: 36
DRG: 342 | End: 2021-12-04
Payer: COMMERCIAL

## 2021-12-04 VITALS
DIASTOLIC BLOOD PRESSURE: 91 MMHG | SYSTOLIC BLOOD PRESSURE: 149 MMHG | RESPIRATION RATE: 8 BRPM | TEMPERATURE: 97.7 F | OXYGEN SATURATION: 100 %

## 2021-12-04 PROBLEM — K37 APPENDICITIS: Status: ACTIVE | Noted: 2021-12-04

## 2021-12-04 PROBLEM — K35.80 ACUTE APPENDICITIS: Status: ACTIVE | Noted: 2021-12-04

## 2021-12-04 LAB
ALBUMIN SERPL-MCNC: 4.7 GM/DL (ref 3.4–5)
ALP BLD-CCNC: 134 IU/L (ref 40–129)
ALT SERPL-CCNC: 77 U/L (ref 10–40)
AMORPHOUS: ABNORMAL /HPF
ANION GAP SERPL CALCULATED.3IONS-SCNC: 13 MMOL/L (ref 4–16)
AST SERPL-CCNC: 37 IU/L (ref 15–37)
BACTERIA: NEGATIVE /HPF
BASOPHILS ABSOLUTE: 0 K/CU MM
BASOPHILS RELATIVE PERCENT: 0.3 % (ref 0–1)
BILIRUB SERPL-MCNC: 0.8 MG/DL (ref 0–1)
BILIRUBIN URINE: NEGATIVE MG/DL
BLOOD, URINE: NEGATIVE
BUN BLDV-MCNC: 9 MG/DL (ref 6–23)
CALCIUM SERPL-MCNC: 9.7 MG/DL (ref 8.3–10.6)
CHLORIDE BLD-SCNC: 96 MMOL/L (ref 99–110)
CLARITY: ABNORMAL
CO2: 22 MMOL/L (ref 21–32)
COLOR: YELLOW
CREAT SERPL-MCNC: 0.7 MG/DL (ref 0.6–1.1)
DIFFERENTIAL TYPE: ABNORMAL
EOSINOPHILS ABSOLUTE: 0 K/CU MM
EOSINOPHILS RELATIVE PERCENT: 0.4 % (ref 0–3)
GFR AFRICAN AMERICAN: >60 ML/MIN/1.73M2
GFR NON-AFRICAN AMERICAN: >60 ML/MIN/1.73M2
GLUCOSE BLD-MCNC: 93 MG/DL (ref 70–99)
GLUCOSE, URINE: NEGATIVE MG/DL
HCT VFR BLD CALC: 42.9 % (ref 37–47)
HEMOGLOBIN: 14.2 GM/DL (ref 12.5–16)
IMMATURE NEUTROPHIL %: 0.2 % (ref 0–0.43)
KETONES, URINE: NEGATIVE MG/DL
LEUKOCYTE ESTERASE, URINE: NEGATIVE
LIPASE: 31 IU/L (ref 13–60)
LYMPHOCYTES ABSOLUTE: 1 K/CU MM
LYMPHOCYTES RELATIVE PERCENT: 11.6 % (ref 24–44)
MCH RBC QN AUTO: 28.6 PG (ref 27–31)
MCHC RBC AUTO-ENTMCNC: 33.1 % (ref 32–36)
MCV RBC AUTO: 86.3 FL (ref 78–100)
MONOCYTES ABSOLUTE: 0.4 K/CU MM
MONOCYTES RELATIVE PERCENT: 3.9 % (ref 0–4)
NITRITE URINE, QUANTITATIVE: NEGATIVE
NUCLEATED RBC %: 0 %
PDW BLD-RTO: 12.8 % (ref 11.7–14.9)
PH, URINE: 7 (ref 5–8)
PLATELET # BLD: 341 K/CU MM (ref 140–440)
PMV BLD AUTO: 8.9 FL (ref 7.5–11.1)
POTASSIUM SERPL-SCNC: 3.6 MMOL/L (ref 3.5–5.1)
PROTEIN UA: NEGATIVE MG/DL
RBC # BLD: 4.97 M/CU MM (ref 4.2–5.4)
RBC URINE: ABNORMAL /HPF (ref 0–6)
SARS-COV-2, NAAT: NOT DETECTED
SEGMENTED NEUTROPHILS ABSOLUTE COUNT: 7.4 K/CU MM
SEGMENTED NEUTROPHILS RELATIVE PERCENT: 83.6 % (ref 36–66)
SODIUM BLD-SCNC: 131 MMOL/L (ref 135–145)
SOURCE: NORMAL
SPECIFIC GRAVITY UA: 1.01 (ref 1–1.03)
SQUAMOUS EPITHELIAL: 16 /HPF
TOTAL IMMATURE NEUTOROPHIL: 0.02 K/CU MM
TOTAL NUCLEATED RBC: 0 K/CU MM
TOTAL PROTEIN: 8.1 GM/DL (ref 6.4–8.2)
TRICHOMONAS: ABNORMAL /HPF
UROBILINOGEN, URINE: NEGATIVE MG/DL (ref 0.2–1)
WBC # BLD: 8.9 K/CU MM (ref 4–10.5)
WBC UA: ABNORMAL /HPF (ref 0–5)

## 2021-12-04 PROCEDURE — 44970 LAPAROSCOPY APPENDECTOMY: CPT | Performed by: SURGERY

## 2021-12-04 PROCEDURE — 96375 TX/PRO/DX INJ NEW DRUG ADDON: CPT

## 2021-12-04 PROCEDURE — 6370000000 HC RX 637 (ALT 250 FOR IP): Performed by: PHYSICIAN ASSISTANT

## 2021-12-04 PROCEDURE — 2580000003 HC RX 258: Performed by: NURSE ANESTHETIST, CERTIFIED REGISTERED

## 2021-12-04 PROCEDURE — 1200000000 HC SEMI PRIVATE

## 2021-12-04 PROCEDURE — 6360000002 HC RX W HCPCS: Performed by: PHYSICIAN ASSISTANT

## 2021-12-04 PROCEDURE — 2500000003 HC RX 250 WO HCPCS: Performed by: NURSE ANESTHETIST, CERTIFIED REGISTERED

## 2021-12-04 PROCEDURE — 2720000010 HC SURG SUPPLY STERILE: Performed by: SURGERY

## 2021-12-04 PROCEDURE — 6370000000 HC RX 637 (ALT 250 FOR IP): Performed by: INTERNAL MEDICINE

## 2021-12-04 PROCEDURE — 0DTJ4ZZ RESECTION OF APPENDIX, PERCUTANEOUS ENDOSCOPIC APPROACH: ICD-10-PCS | Performed by: SURGERY

## 2021-12-04 PROCEDURE — G0378 HOSPITAL OBSERVATION PER HR: HCPCS

## 2021-12-04 PROCEDURE — 3700000001 HC ADD 15 MINUTES (ANESTHESIA): Performed by: SURGERY

## 2021-12-04 PROCEDURE — 7100000001 HC PACU RECOVERY - ADDTL 15 MIN: Performed by: SURGERY

## 2021-12-04 PROCEDURE — 2580000003 HC RX 258: Performed by: PHYSICIAN ASSISTANT

## 2021-12-04 PROCEDURE — 74177 CT ABD & PELVIS W/CONTRAST: CPT

## 2021-12-04 PROCEDURE — 3600000014 HC SURGERY LEVEL 4 ADDTL 15MIN: Performed by: SURGERY

## 2021-12-04 PROCEDURE — 99222 1ST HOSP IP/OBS MODERATE 55: CPT | Performed by: SURGERY

## 2021-12-04 PROCEDURE — 3600000004 HC SURGERY LEVEL 4 BASE: Performed by: SURGERY

## 2021-12-04 PROCEDURE — 2500000003 HC RX 250 WO HCPCS: Performed by: PHYSICIAN ASSISTANT

## 2021-12-04 PROCEDURE — 2580000003 HC RX 258: Performed by: NURSE PRACTITIONER

## 2021-12-04 PROCEDURE — 2500000003 HC RX 250 WO HCPCS: Performed by: SURGERY

## 2021-12-04 PROCEDURE — 6360000002 HC RX W HCPCS: Performed by: NURSE ANESTHETIST, CERTIFIED REGISTERED

## 2021-12-04 PROCEDURE — 99283 EMERGENCY DEPT VISIT LOW MDM: CPT

## 2021-12-04 PROCEDURE — 87635 SARS-COV-2 COVID-19 AMP PRB: CPT

## 2021-12-04 PROCEDURE — 6360000002 HC RX W HCPCS: Performed by: INTERNAL MEDICINE

## 2021-12-04 PROCEDURE — 96372 THER/PROPH/DIAG INJ SC/IM: CPT

## 2021-12-04 PROCEDURE — 44970 LAPAROSCOPY APPENDECTOMY: CPT | Performed by: NURSE PRACTITIONER

## 2021-12-04 PROCEDURE — 7100000000 HC PACU RECOVERY - FIRST 15 MIN: Performed by: SURGERY

## 2021-12-04 PROCEDURE — 6370000000 HC RX 637 (ALT 250 FOR IP): Performed by: NURSE PRACTITIONER

## 2021-12-04 PROCEDURE — 6360000002 HC RX W HCPCS: Performed by: ANESTHESIOLOGY

## 2021-12-04 PROCEDURE — 6360000004 HC RX CONTRAST MEDICATION: Performed by: PHYSICIAN ASSISTANT

## 2021-12-04 PROCEDURE — 3700000000 HC ANESTHESIA ATTENDED CARE: Performed by: SURGERY

## 2021-12-04 PROCEDURE — 88304 TISSUE EXAM BY PATHOLOGIST: CPT | Performed by: PATHOLOGY

## 2021-12-04 PROCEDURE — APPNB180 APP NON BILLABLE TIME > 60 MINS: Performed by: NURSE PRACTITIONER

## 2021-12-04 PROCEDURE — 96365 THER/PROPH/DIAG IV INF INIT: CPT

## 2021-12-04 PROCEDURE — 2709999900 HC NON-CHARGEABLE SUPPLY: Performed by: SURGERY

## 2021-12-04 PROCEDURE — 96366 THER/PROPH/DIAG IV INF ADDON: CPT

## 2021-12-04 RX ORDER — KETAMINE HCL 50MG/ML(1)
SYRINGE (ML) INTRAVENOUS PRN
Status: DISCONTINUED | OUTPATIENT
Start: 2021-12-04 | End: 2021-12-04 | Stop reason: SDUPTHER

## 2021-12-04 RX ORDER — DEXTROAMPHETAMINE SACCHARATE, AMPHETAMINE ASPARTATE, DEXTROAMPHETAMINE SULFATE AND AMPHETAMINE SULFATE 7.5; 7.5; 7.5; 7.5 MG/1; MG/1; MG/1; MG/1
30 TABLET ORAL 2 TIMES DAILY
COMMUNITY

## 2021-12-04 RX ORDER — ONDANSETRON 2 MG/ML
4 INJECTION INTRAMUSCULAR; INTRAVENOUS EVERY 6 HOURS PRN
Status: DISCONTINUED | OUTPATIENT
Start: 2021-12-04 | End: 2021-12-04

## 2021-12-04 RX ORDER — ZOLPIDEM TARTRATE 5 MG/1
5 TABLET ORAL NIGHTLY PRN
Status: DISCONTINUED | OUTPATIENT
Start: 2021-12-05 | End: 2021-12-05 | Stop reason: ALTCHOICE

## 2021-12-04 RX ORDER — ONDANSETRON 2 MG/ML
INJECTION INTRAMUSCULAR; INTRAVENOUS PRN
Status: DISCONTINUED | OUTPATIENT
Start: 2021-12-04 | End: 2021-12-04 | Stop reason: SDUPTHER

## 2021-12-04 RX ORDER — HYDRALAZINE HYDROCHLORIDE 20 MG/ML
5 INJECTION INTRAMUSCULAR; INTRAVENOUS EVERY 10 MIN PRN
Status: DISCONTINUED | OUTPATIENT
Start: 2021-12-04 | End: 2021-12-04 | Stop reason: HOSPADM

## 2021-12-04 RX ORDER — MEPERIDINE HYDROCHLORIDE 25 MG/ML
12.5 INJECTION INTRAMUSCULAR; INTRAVENOUS; SUBCUTANEOUS EVERY 5 MIN PRN
Status: DISCONTINUED | OUTPATIENT
Start: 2021-12-04 | End: 2021-12-04 | Stop reason: HOSPADM

## 2021-12-04 RX ORDER — ROCURONIUM BROMIDE 10 MG/ML
INJECTION, SOLUTION INTRAVENOUS PRN
Status: DISCONTINUED | OUTPATIENT
Start: 2021-12-04 | End: 2021-12-04 | Stop reason: SDUPTHER

## 2021-12-04 RX ORDER — ONDANSETRON 2 MG/ML
4 INJECTION INTRAMUSCULAR; INTRAVENOUS EVERY 6 HOURS PRN
Status: DISCONTINUED | OUTPATIENT
Start: 2021-12-04 | End: 2021-12-05 | Stop reason: HOSPADM

## 2021-12-04 RX ORDER — FENTANYL CITRATE 50 UG/ML
50 INJECTION, SOLUTION INTRAMUSCULAR; INTRAVENOUS ONCE
Status: COMPLETED | OUTPATIENT
Start: 2021-12-04 | End: 2021-12-04

## 2021-12-04 RX ORDER — PROMETHAZINE HYDROCHLORIDE 25 MG/ML
6.25 INJECTION, SOLUTION INTRAMUSCULAR; INTRAVENOUS
Status: DISCONTINUED | OUTPATIENT
Start: 2021-12-04 | End: 2021-12-04 | Stop reason: HOSPADM

## 2021-12-04 RX ORDER — KETOROLAC TROMETHAMINE 30 MG/ML
INJECTION, SOLUTION INTRAMUSCULAR; INTRAVENOUS PRN
Status: DISCONTINUED | OUTPATIENT
Start: 2021-12-04 | End: 2021-12-04 | Stop reason: SDUPTHER

## 2021-12-04 RX ORDER — DIPHENHYDRAMINE HYDROCHLORIDE 50 MG/ML
12.5 INJECTION INTRAMUSCULAR; INTRAVENOUS
Status: DISCONTINUED | OUTPATIENT
Start: 2021-12-04 | End: 2021-12-04 | Stop reason: HOSPADM

## 2021-12-04 RX ORDER — HYDROCODONE BITARTRATE AND ACETAMINOPHEN 5; 325 MG/1; MG/1
1 TABLET ORAL PRN
Status: DISCONTINUED | OUTPATIENT
Start: 2021-12-04 | End: 2021-12-04 | Stop reason: HOSPADM

## 2021-12-04 RX ORDER — 0.9 % SODIUM CHLORIDE 0.9 %
500 INTRAVENOUS SOLUTION INTRAVENOUS
Status: DISCONTINUED | OUTPATIENT
Start: 2021-12-04 | End: 2021-12-04 | Stop reason: HOSPADM

## 2021-12-04 RX ORDER — KETAMINE HYDROCHLORIDE 10 MG/ML
70 INJECTION, SOLUTION INTRAMUSCULAR; INTRAVENOUS ONCE
Status: DISCONTINUED | OUTPATIENT
Start: 2021-12-04 | End: 2021-12-05 | Stop reason: HOSPADM

## 2021-12-04 RX ORDER — OXYCODONE HYDROCHLORIDE AND ACETAMINOPHEN 5; 325 MG/1; MG/1
1 TABLET ORAL EVERY 4 HOURS PRN
Status: DISCONTINUED | OUTPATIENT
Start: 2021-12-04 | End: 2021-12-05 | Stop reason: HOSPADM

## 2021-12-04 RX ORDER — DEXAMETHASONE SODIUM PHOSPHATE 4 MG/ML
INJECTION, SOLUTION INTRA-ARTICULAR; INTRALESIONAL; INTRAMUSCULAR; INTRAVENOUS; SOFT TISSUE PRN
Status: DISCONTINUED | OUTPATIENT
Start: 2021-12-04 | End: 2021-12-04 | Stop reason: SDUPTHER

## 2021-12-04 RX ORDER — LABETALOL HYDROCHLORIDE 5 MG/ML
5 INJECTION, SOLUTION INTRAVENOUS EVERY 10 MIN PRN
Status: DISCONTINUED | OUTPATIENT
Start: 2021-12-04 | End: 2021-12-04 | Stop reason: HOSPADM

## 2021-12-04 RX ORDER — SODIUM CHLORIDE, SODIUM LACTATE, POTASSIUM CHLORIDE, CALCIUM CHLORIDE 600; 310; 30; 20 MG/100ML; MG/100ML; MG/100ML; MG/100ML
INJECTION, SOLUTION INTRAVENOUS CONTINUOUS
Status: DISCONTINUED | OUTPATIENT
Start: 2021-12-04 | End: 2021-12-04

## 2021-12-04 RX ORDER — POLYETHYLENE GLYCOL 3350 17 G/17G
17 POWDER, FOR SOLUTION ORAL DAILY PRN
Status: DISCONTINUED | OUTPATIENT
Start: 2021-12-04 | End: 2021-12-04

## 2021-12-04 RX ORDER — SODIUM CHLORIDE 9 MG/ML
25 INJECTION, SOLUTION INTRAVENOUS PRN
Status: DISCONTINUED | OUTPATIENT
Start: 2021-12-04 | End: 2021-12-04 | Stop reason: HOSPADM

## 2021-12-04 RX ORDER — SODIUM CHLORIDE 0.9 % (FLUSH) 0.9 %
5-40 SYRINGE (ML) INJECTION EVERY 12 HOURS SCHEDULED
Status: DISCONTINUED | OUTPATIENT
Start: 2021-12-04 | End: 2021-12-04 | Stop reason: HOSPADM

## 2021-12-04 RX ORDER — ONDANSETRON 4 MG/1
4 TABLET, ORALLY DISINTEGRATING ORAL EVERY 8 HOURS PRN
Status: DISCONTINUED | OUTPATIENT
Start: 2021-12-04 | End: 2021-12-04

## 2021-12-04 RX ORDER — SODIUM CHLORIDE 0.9 % (FLUSH) 0.9 %
5-40 SYRINGE (ML) INJECTION PRN
Status: DISCONTINUED | OUTPATIENT
Start: 2021-12-04 | End: 2021-12-05 | Stop reason: HOSPADM

## 2021-12-04 RX ORDER — ONDANSETRON 4 MG/1
4 TABLET, ORALLY DISINTEGRATING ORAL EVERY 8 HOURS PRN
Status: DISCONTINUED | OUTPATIENT
Start: 2021-12-04 | End: 2021-12-05 | Stop reason: HOSPADM

## 2021-12-04 RX ORDER — HYDROMORPHONE HCL 110MG/55ML
1 PATIENT CONTROLLED ANALGESIA SYRINGE INTRAVENOUS EVERY 4 HOURS PRN
Status: DISCONTINUED | OUTPATIENT
Start: 2021-12-04 | End: 2021-12-04

## 2021-12-04 RX ORDER — SODIUM CHLORIDE 9 MG/ML
INJECTION, SOLUTION INTRAVENOUS CONTINUOUS
Status: DISCONTINUED | OUTPATIENT
Start: 2021-12-04 | End: 2021-12-05 | Stop reason: HOSPADM

## 2021-12-04 RX ORDER — KETOROLAC TROMETHAMINE 30 MG/ML
30 INJECTION, SOLUTION INTRAMUSCULAR; INTRAVENOUS ONCE
Status: COMPLETED | OUTPATIENT
Start: 2021-12-04 | End: 2021-12-04

## 2021-12-04 RX ORDER — QUETIAPINE FUMARATE 100 MG/1
100 TABLET, FILM COATED ORAL NIGHTLY PRN
COMMUNITY

## 2021-12-04 RX ORDER — SODIUM CHLORIDE 0.9 % (FLUSH) 0.9 %
5-40 SYRINGE (ML) INJECTION EVERY 12 HOURS SCHEDULED
Status: DISCONTINUED | OUTPATIENT
Start: 2021-12-04 | End: 2021-12-04

## 2021-12-04 RX ORDER — KETOROLAC TROMETHAMINE 30 MG/ML
15 INJECTION, SOLUTION INTRAMUSCULAR; INTRAVENOUS EVERY 6 HOURS PRN
Status: DISCONTINUED | OUTPATIENT
Start: 2021-12-04 | End: 2021-12-05 | Stop reason: HOSPADM

## 2021-12-04 RX ORDER — ARIPIPRAZOLE 10 MG/1
30 TABLET ORAL NIGHTLY
Status: DISCONTINUED | OUTPATIENT
Start: 2021-12-05 | End: 2021-12-05

## 2021-12-04 RX ORDER — QUETIAPINE FUMARATE 300 MG/1
300 TABLET, FILM COATED ORAL NIGHTLY
COMMUNITY

## 2021-12-04 RX ORDER — HYDROCODONE BITARTRATE AND ACETAMINOPHEN 5; 325 MG/1; MG/1
2 TABLET ORAL EVERY 6 HOURS PRN
Status: DISCONTINUED | OUTPATIENT
Start: 2021-12-04 | End: 2021-12-04 | Stop reason: HOSPADM

## 2021-12-04 RX ORDER — SODIUM CHLORIDE, SODIUM LACTATE, POTASSIUM CHLORIDE, CALCIUM CHLORIDE 600; 310; 30; 20 MG/100ML; MG/100ML; MG/100ML; MG/100ML
INJECTION, SOLUTION INTRAVENOUS CONTINUOUS PRN
Status: DISCONTINUED | OUTPATIENT
Start: 2021-12-04 | End: 2021-12-04 | Stop reason: SDUPTHER

## 2021-12-04 RX ORDER — SODIUM CHLORIDE 0.9 % (FLUSH) 0.9 %
5-40 SYRINGE (ML) INJECTION EVERY 12 HOURS SCHEDULED
Status: DISCONTINUED | OUTPATIENT
Start: 2021-12-04 | End: 2021-12-05 | Stop reason: HOSPADM

## 2021-12-04 RX ORDER — 0.9 % SODIUM CHLORIDE 0.9 %
1000 INTRAVENOUS SOLUTION INTRAVENOUS ONCE
Status: COMPLETED | OUTPATIENT
Start: 2021-12-04 | End: 2021-12-04

## 2021-12-04 RX ORDER — ACETAMINOPHEN 325 MG/1
650 TABLET ORAL EVERY 6 HOURS PRN
Status: DISCONTINUED | OUTPATIENT
Start: 2021-12-04 | End: 2021-12-04

## 2021-12-04 RX ORDER — OXYCODONE HYDROCHLORIDE 5 MG/1
5 TABLET ORAL EVERY 4 HOURS PRN
Status: DISCONTINUED | OUTPATIENT
Start: 2021-12-04 | End: 2021-12-04

## 2021-12-04 RX ORDER — SODIUM CHLORIDE 0.9 % (FLUSH) 0.9 %
5-40 SYRINGE (ML) INJECTION PRN
Status: DISCONTINUED | OUTPATIENT
Start: 2021-12-04 | End: 2021-12-04 | Stop reason: HOSPADM

## 2021-12-04 RX ORDER — SODIUM CHLORIDE 9 MG/ML
25 INJECTION, SOLUTION INTRAVENOUS PRN
Status: DISCONTINUED | OUTPATIENT
Start: 2021-12-04 | End: 2021-12-04

## 2021-12-04 RX ORDER — PROPOFOL 10 MG/ML
INJECTION, EMULSION INTRAVENOUS PRN
Status: DISCONTINUED | OUTPATIENT
Start: 2021-12-04 | End: 2021-12-04 | Stop reason: SDUPTHER

## 2021-12-04 RX ORDER — BUPIVACAINE HYDROCHLORIDE 5 MG/ML
INJECTION, SOLUTION EPIDURAL; INTRACAUDAL
Status: COMPLETED | OUTPATIENT
Start: 2021-12-04 | End: 2021-12-04

## 2021-12-04 RX ORDER — QUETIAPINE FUMARATE 100 MG/1
300 TABLET, FILM COATED ORAL NIGHTLY
Status: DISCONTINUED | OUTPATIENT
Start: 2021-12-05 | End: 2021-12-05

## 2021-12-04 RX ORDER — DEXTROAMPHETAMINE SACCHARATE, AMPHETAMINE ASPARTATE, DEXTROAMPHETAMINE SULFATE AND AMPHETAMINE SULFATE 7.5; 7.5; 7.5; 7.5 MG/1; MG/1; MG/1; MG/1
30 TABLET ORAL 2 TIMES DAILY
Status: DISCONTINUED | OUTPATIENT
Start: 2021-12-05 | End: 2021-12-05 | Stop reason: HOSPADM

## 2021-12-04 RX ORDER — ONDANSETRON 2 MG/ML
4 INJECTION INTRAMUSCULAR; INTRAVENOUS EVERY 30 MIN PRN
Status: DISCONTINUED | OUTPATIENT
Start: 2021-12-04 | End: 2021-12-04

## 2021-12-04 RX ORDER — FENTANYL CITRATE 50 UG/ML
50 INJECTION, SOLUTION INTRAMUSCULAR; INTRAVENOUS EVERY 5 MIN PRN
Status: COMPLETED | OUTPATIENT
Start: 2021-12-04 | End: 2021-12-04

## 2021-12-04 RX ORDER — ACETAMINOPHEN 500 MG
1000 TABLET ORAL ONCE
Status: COMPLETED | OUTPATIENT
Start: 2021-12-04 | End: 2021-12-04

## 2021-12-04 RX ORDER — LIDOCAINE HYDROCHLORIDE 20 MG/ML
INJECTION, SOLUTION INTRAVENOUS PRN
Status: DISCONTINUED | OUTPATIENT
Start: 2021-12-04 | End: 2021-12-04 | Stop reason: SDUPTHER

## 2021-12-04 RX ORDER — DIAZEPAM 5 MG/1
5 TABLET ORAL EVERY 6 HOURS PRN
Status: DISCONTINUED | OUTPATIENT
Start: 2021-12-04 | End: 2021-12-05 | Stop reason: HOSPADM

## 2021-12-04 RX ORDER — DICYCLOMINE HYDROCHLORIDE 10 MG/ML
20 INJECTION INTRAMUSCULAR ONCE
Status: COMPLETED | OUTPATIENT
Start: 2021-12-04 | End: 2021-12-04

## 2021-12-04 RX ORDER — FENTANYL CITRATE 50 UG/ML
INJECTION, SOLUTION INTRAMUSCULAR; INTRAVENOUS PRN
Status: DISCONTINUED | OUTPATIENT
Start: 2021-12-04 | End: 2021-12-04 | Stop reason: SDUPTHER

## 2021-12-04 RX ORDER — SODIUM CHLORIDE 9 MG/ML
25 INJECTION, SOLUTION INTRAVENOUS PRN
Status: DISCONTINUED | OUTPATIENT
Start: 2021-12-04 | End: 2021-12-05 | Stop reason: HOSPADM

## 2021-12-04 RX ORDER — SODIUM CHLORIDE 0.9 % (FLUSH) 0.9 %
5-40 SYRINGE (ML) INJECTION PRN
Status: DISCONTINUED | OUTPATIENT
Start: 2021-12-04 | End: 2021-12-04

## 2021-12-04 RX ORDER — ACETAMINOPHEN 650 MG/1
650 SUPPOSITORY RECTAL EVERY 6 HOURS PRN
Status: DISCONTINUED | OUTPATIENT
Start: 2021-12-04 | End: 2021-12-04

## 2021-12-04 RX ORDER — ONDANSETRON 2 MG/ML
4 INJECTION INTRAMUSCULAR; INTRAVENOUS
Status: DISCONTINUED | OUTPATIENT
Start: 2021-12-04 | End: 2021-12-04 | Stop reason: HOSPADM

## 2021-12-04 RX ORDER — QUETIAPINE FUMARATE 100 MG/1
100 TABLET, FILM COATED ORAL NIGHTLY PRN
Status: DISCONTINUED | OUTPATIENT
Start: 2021-12-05 | End: 2021-12-05 | Stop reason: HOSPADM

## 2021-12-04 RX ORDER — HYDROMORPHONE HCL 110MG/55ML
0.5 PATIENT CONTROLLED ANALGESIA SYRINGE INTRAVENOUS EVERY 5 MIN PRN
Status: DISCONTINUED | OUTPATIENT
Start: 2021-12-04 | End: 2021-12-04 | Stop reason: HOSPADM

## 2021-12-04 RX ADMIN — Medication 10 MG: at 13:41

## 2021-12-04 RX ADMIN — HYDROMORPHONE HYDROCHLORIDE 0.5 MG: 2 INJECTION INTRAMUSCULAR; INTRAVENOUS; SUBCUTANEOUS at 16:32

## 2021-12-04 RX ADMIN — DEXAMETHASONE SODIUM PHOSPHATE 4 MG: 4 INJECTION, SOLUTION INTRAMUSCULAR; INTRAVENOUS at 13:44

## 2021-12-04 RX ADMIN — HYDROMORPHONE HYDROCHLORIDE 1 MG: 2 INJECTION INTRAMUSCULAR; INTRAVENOUS; SUBCUTANEOUS at 08:32

## 2021-12-04 RX ADMIN — FENTANYL CITRATE 50 MCG: 50 INJECTION, SOLUTION INTRAMUSCULAR; INTRAVENOUS at 15:42

## 2021-12-04 RX ADMIN — FENTANYL CITRATE 50 MCG: 50 INJECTION, SOLUTION INTRAMUSCULAR; INTRAVENOUS at 16:09

## 2021-12-04 RX ADMIN — PROPOFOL 140 MG: 10 INJECTION, EMULSION INTRAVENOUS at 13:41

## 2021-12-04 RX ADMIN — ROCURONIUM BROMIDE 20 MG: 10 INJECTION INTRAVENOUS at 14:33

## 2021-12-04 RX ADMIN — Medication 15 MG: at 13:55

## 2021-12-04 RX ADMIN — OXYCODONE HYDROCHLORIDE 5 MG: 5 TABLET ORAL at 09:16

## 2021-12-04 RX ADMIN — HYDROMORPHONE HYDROCHLORIDE 0.5 MG: 2 INJECTION INTRAMUSCULAR; INTRAVENOUS; SUBCUTANEOUS at 16:25

## 2021-12-04 RX ADMIN — ACETAMINOPHEN 1000 MG: 500 TABLET ORAL at 01:17

## 2021-12-04 RX ADMIN — OXYCODONE AND ACETAMINOPHEN 1 TABLET: 5; 325 TABLET ORAL at 18:34

## 2021-12-04 RX ADMIN — FENTANYL CITRATE 50 MCG: 50 INJECTION, SOLUTION INTRAMUSCULAR; INTRAVENOUS at 15:35

## 2021-12-04 RX ADMIN — FENTANYL CITRATE 50 MCG: 50 INJECTION, SOLUTION INTRAMUSCULAR; INTRAVENOUS at 15:25

## 2021-12-04 RX ADMIN — SODIUM CHLORIDE: 9 INJECTION, SOLUTION INTRAVENOUS at 16:21

## 2021-12-04 RX ADMIN — SODIUM CHLORIDE 1000 ML: 9 INJECTION, SOLUTION INTRAVENOUS at 00:36

## 2021-12-04 RX ADMIN — KETOROLAC TROMETHAMINE 30 MG: 30 INJECTION, SOLUTION INTRAMUSCULAR; INTRAVENOUS at 00:34

## 2021-12-04 RX ADMIN — Medication 25 MG: at 14:51

## 2021-12-04 RX ADMIN — KETOROLAC TROMETHAMINE 15 MG: 30 INJECTION, SOLUTION INTRAMUSCULAR; INTRAVENOUS at 14:55

## 2021-12-04 RX ADMIN — FENTANYL CITRATE 50 MCG: 50 INJECTION, SOLUTION INTRAMUSCULAR; INTRAVENOUS at 13:56

## 2021-12-04 RX ADMIN — ONDANSETRON 4 MG: 2 INJECTION INTRAMUSCULAR; INTRAVENOUS at 01:16

## 2021-12-04 RX ADMIN — SODIUM CHLORIDE, POTASSIUM CHLORIDE, SODIUM LACTATE AND CALCIUM CHLORIDE: 600; 310; 30; 20 INJECTION, SOLUTION INTRAVENOUS at 13:35

## 2021-12-04 RX ADMIN — IOPAMIDOL 75 ML: 755 INJECTION, SOLUTION INTRAVENOUS at 01:39

## 2021-12-04 RX ADMIN — PIPERACILLIN AND TAZOBACTAM 3375 MG: 3; .375 INJECTION, POWDER, FOR SOLUTION INTRAVENOUS at 04:52

## 2021-12-04 RX ADMIN — OXYCODONE AND ACETAMINOPHEN 1 TABLET: 5; 325 TABLET ORAL at 23:20

## 2021-12-04 RX ADMIN — SUGAMMADEX 200 MG: 100 INJECTION, SOLUTION INTRAVENOUS at 14:57

## 2021-12-04 RX ADMIN — FENTANYL CITRATE 50 MCG: 0.05 INJECTION, SOLUTION INTRAMUSCULAR; INTRAVENOUS at 04:45

## 2021-12-04 RX ADMIN — FAMOTIDINE 20 MG: 10 INJECTION, SOLUTION INTRAVENOUS at 00:34

## 2021-12-04 RX ADMIN — ONDANSETRON 4 MG: 2 INJECTION INTRAMUSCULAR; INTRAVENOUS at 14:50

## 2021-12-04 RX ADMIN — DICYCLOMINE HYDROCHLORIDE 20 MG: 20 INJECTION INTRAMUSCULAR at 01:16

## 2021-12-04 RX ADMIN — LIDOCAINE HYDROCHLORIDE 100 MG: 20 INJECTION, SOLUTION INTRAVENOUS at 13:41

## 2021-12-04 RX ADMIN — ROCURONIUM BROMIDE 50 MG: 10 INJECTION INTRAVENOUS at 13:41

## 2021-12-04 RX ADMIN — ENOXAPARIN SODIUM 40 MG: 100 INJECTION SUBCUTANEOUS at 08:33

## 2021-12-04 RX ADMIN — FENTANYL CITRATE 50 MCG: 50 INJECTION, SOLUTION INTRAMUSCULAR; INTRAVENOUS at 13:39

## 2021-12-04 ASSESSMENT — PAIN DESCRIPTION - DESCRIPTORS
DESCRIPTORS: DISCOMFORT
DESCRIPTORS: DISCOMFORT
DESCRIPTORS: ACHING;DISCOMFORT
DESCRIPTORS: ACHING
DESCRIPTORS: ACHING;DISCOMFORT
DESCRIPTORS: ACHING
DESCRIPTORS: DISCOMFORT
DESCRIPTORS: SHARP;ACHING
DESCRIPTORS: DISCOMFORT
DESCRIPTORS: SHARP

## 2021-12-04 ASSESSMENT — PULMONARY FUNCTION TESTS
PIF_VALUE: 29
PIF_VALUE: 21
PIF_VALUE: 31
PIF_VALUE: 18
PIF_VALUE: 20
PIF_VALUE: 19
PIF_VALUE: 28
PIF_VALUE: 32
PIF_VALUE: 29
PIF_VALUE: 29
PIF_VALUE: 28
PIF_VALUE: 22
PIF_VALUE: 21
PIF_VALUE: 28
PIF_VALUE: 27
PIF_VALUE: 28
PIF_VALUE: 29
PIF_VALUE: 28
PIF_VALUE: 30
PIF_VALUE: 22
PIF_VALUE: 29
PIF_VALUE: 25
PIF_VALUE: 20
PIF_VALUE: 22
PIF_VALUE: 21
PIF_VALUE: 0
PIF_VALUE: 21
PIF_VALUE: 21
PIF_VALUE: 31
PIF_VALUE: 29
PIF_VALUE: 23
PIF_VALUE: 28
PIF_VALUE: 29
PIF_VALUE: 23
PIF_VALUE: 23
PIF_VALUE: 30
PIF_VALUE: 21
PIF_VALUE: 21
PIF_VALUE: 18
PIF_VALUE: 21
PIF_VALUE: 0
PIF_VALUE: 31
PIF_VALUE: 22
PIF_VALUE: 19
PIF_VALUE: 21
PIF_VALUE: 8
PIF_VALUE: 20
PIF_VALUE: 18
PIF_VALUE: 21
PIF_VALUE: 28
PIF_VALUE: 20
PIF_VALUE: 29
PIF_VALUE: 22
PIF_VALUE: 23
PIF_VALUE: 21
PIF_VALUE: 22
PIF_VALUE: 18
PIF_VALUE: 21
PIF_VALUE: 30
PIF_VALUE: 28
PIF_VALUE: 29
PIF_VALUE: 20
PIF_VALUE: 29
PIF_VALUE: 38
PIF_VALUE: 29
PIF_VALUE: 25
PIF_VALUE: 32
PIF_VALUE: 0
PIF_VALUE: 29
PIF_VALUE: 30
PIF_VALUE: 0
PIF_VALUE: 29
PIF_VALUE: 30
PIF_VALUE: 22
PIF_VALUE: 23
PIF_VALUE: 28
PIF_VALUE: 30
PIF_VALUE: 30
PIF_VALUE: 22
PIF_VALUE: 20
PIF_VALUE: 28
PIF_VALUE: 4
PIF_VALUE: 18
PIF_VALUE: 21
PIF_VALUE: 26
PIF_VALUE: 22
PIF_VALUE: 32
PIF_VALUE: 29
PIF_VALUE: 21
PIF_VALUE: 24
PIF_VALUE: 27
PIF_VALUE: 0
PIF_VALUE: 0
PIF_VALUE: 22
PIF_VALUE: 21
PIF_VALUE: 29
PIF_VALUE: 20
PIF_VALUE: 30
PIF_VALUE: 18

## 2021-12-04 ASSESSMENT — PAIN SCALES - GENERAL
PAINLEVEL_OUTOF10: 10
PAINLEVEL_OUTOF10: 9
PAINLEVEL_OUTOF10: 7
PAINLEVEL_OUTOF10: 10
PAINLEVEL_OUTOF10: 4
PAINLEVEL_OUTOF10: 8
PAINLEVEL_OUTOF10: 7
PAINLEVEL_OUTOF10: 10
PAINLEVEL_OUTOF10: 10
PAINLEVEL_OUTOF10: 8
PAINLEVEL_OUTOF10: 8
PAINLEVEL_OUTOF10: 5
PAINLEVEL_OUTOF10: 8
PAINLEVEL_OUTOF10: 10
PAINLEVEL_OUTOF10: 5
PAINLEVEL_OUTOF10: 7
PAINLEVEL_OUTOF10: 9
PAINLEVEL_OUTOF10: 9

## 2021-12-04 ASSESSMENT — PAIN DESCRIPTION - FREQUENCY
FREQUENCY: CONTINUOUS

## 2021-12-04 ASSESSMENT — PAIN DESCRIPTION - ONSET
ONSET: ON-GOING

## 2021-12-04 ASSESSMENT — PAIN DESCRIPTION - LOCATION
LOCATION: ABDOMEN

## 2021-12-04 ASSESSMENT — PAIN DESCRIPTION - ORIENTATION
ORIENTATION: RIGHT
ORIENTATION: MID
ORIENTATION: RIGHT
ORIENTATION: RIGHT
ORIENTATION: MID
ORIENTATION: RIGHT
ORIENTATION: RIGHT

## 2021-12-04 ASSESSMENT — PAIN DESCRIPTION - PAIN TYPE
TYPE: SURGICAL PAIN
TYPE: ACUTE PAIN
TYPE: SURGICAL PAIN
TYPE: ACUTE PAIN
TYPE: ACUTE PAIN
TYPE: SURGICAL PAIN

## 2021-12-04 ASSESSMENT — ENCOUNTER SYMPTOMS
SHORTNESS OF BREATH: 1
EYE DISCHARGE: 0
SORE THROAT: 0
CHEST TIGHTNESS: 0
BLOOD IN STOOL: 0
DIARRHEA: 1
VOMITING: 1
NAUSEA: 1
SHORTNESS OF BREATH: 0
COLOR CHANGE: 0
BACK PAIN: 1
CONSTIPATION: 1
ABDOMINAL DISTENTION: 0
EYE REDNESS: 0
ABDOMINAL PAIN: 1

## 2021-12-04 ASSESSMENT — PAIN DESCRIPTION - PROGRESSION
CLINICAL_PROGRESSION: NOT CHANGED
CLINICAL_PROGRESSION: GRADUALLY IMPROVING
CLINICAL_PROGRESSION: NOT CHANGED
CLINICAL_PROGRESSION: NOT CHANGED

## 2021-12-04 ASSESSMENT — PAIN - FUNCTIONAL ASSESSMENT
PAIN_FUNCTIONAL_ASSESSMENT: PREVENTS OR INTERFERES SOME ACTIVE ACTIVITIES AND ADLS
PAIN_FUNCTIONAL_ASSESSMENT: ACTIVITIES ARE NOT PREVENTED
PAIN_FUNCTIONAL_ASSESSMENT: ACTIVITIES ARE NOT PREVENTED
PAIN_FUNCTIONAL_ASSESSMENT: PREVENTS OR INTERFERES SOME ACTIVE ACTIVITIES AND ADLS

## 2021-12-04 NOTE — ANESTHESIA POSTPROCEDURE EVALUATION
Department of Anesthesiology  Postprocedure Note    Patient: Stacey Flores  MRN: [id number]  YOB: 1985  Date of evaluation: 12/4/2021  Time:  3:23 PM     Procedure Summary     Date: 12/04/21 Room / Location: [address]    Anesthesia Start: 4615 Anesthesia Stop: 0420    Procedure: APPENDECTOMY LAPAROSCOPIC (N/A Abdomen) Diagnosis: (abdominal pain)    Surgeons: Kavitha Strange MD Responsible Provider: Rizwana Greenfield MD    Anesthesia Type: general ASA Status: 3          Anesthesia Type: general    Ramesh Phase I:      Ramesh Phase II:      Last vitals: Reviewed and per EMR flowsheets.        Anesthesia Post Evaluation    Patient location during evaluation: bedside  Patient participation: complete - patient participated  Level of consciousness: awake and alert  Pain score: 3  Airway patency: patent  Nausea & Vomiting: no vomiting and no nausea  Complications: no  Cardiovascular status: blood pressure returned to baseline and hemodynamically stable  Respiratory status: acceptable, spontaneous ventilation, nonlabored ventilation and nasal cannula  Hydration status: stable
n/a

## 2021-12-04 NOTE — CONSULTS
[address] [facility]    PATIENT: Hammad Ziegler, 1985, 43 y.o., female  MRN: [id number]    Physician: Wale Amaya MD    Date: 12/4/21    Reason for Evaluation & Chief Complaint:     Chief Complaint   Patient presents with    Abdominal Pain     sharp pain around belly button - 10/10, hx of blockage        Requesting Provider: Staci Nguyễn PA-C    History Obtained From:  patient, electronic medical record     HISTORY OF PRESENT ILLNESS:    Prem Tao is a 43 y.o. female presenting with abdominal pain, nausea, and vomiting. She reports a history of IBS related to Suboxone use for her chronic pain, primarily in her hips and knees. She noticed abdominal pain and cramping yesterday which she thought was related to her usual constipation, but did not improve with medications. She then had some loose stool, and noted radiation of the pain to her back. She denies fever chills. Location: Periumbilical pain  Quality, Severity: Moderate to severe  · Pain is described as aching, cramping and sharp  Timing, Duration: Approximately 1 day, constant  Context, Modifying Factors: Denies modifying factors  Associated Signs & Symptoms: Nausea and vomiting. Denies fever chills. Workup Includes: CT abdomen pelvis with fluid-filled small bowel and colon consistent with possible gastroenteritis, but also with stranding near the tip of the appendix consistent with early appendicitis    Past Medical History:    Past Medical History:   Diagnosis Date    Acid reflux     Anxiety     Arthritis     \"Knees\"    Chronic back pain     Chronic knee pain     COPD [facility]chronic obstructive pulmonary disease) ([facility])     Sees Dr. Posey Laser Depression     FHx: coronary artery disease     H/O 24 hour EKG monitoring 5/25/14    24 hr holter monitor. Sinus rhythm.     H/O cardiovascular stress test 06/02/2014    EF 64%, no ischemia, no wall motion abnormality seen, normal perfusion pattern.  H/O echocardiogram 6/02/14 6/14 EF-55% left atrium is mildly dilated.     Hiatal hernia     IBS (irritable bowel syndrome)     Morbid obesity ([facility])     Night terrors     Osteoarthritis     \"Knees\"    Panic attacks     PTSD (post-traumatic stress disorder)     SVT (supraventricular tachycardia) ([facility] 05/2014    \"saw heart dr had stress test, 48 hr holter and echo and all turned out fine\"    Teeth missing     Upper    Wears glasses     Wears partial dentures     Upper       Past Surgical History:    Past Surgical History:   Procedure Laterality Date    CHOLECYSTECTOMY, LAPAROSCOPIC  2015    COLONOSCOPY  2015    DENTAL SURGERY      Teeth Extracted In Past    DILATION AND CURETTAGE OF UTERUS  2011    ENDOSCOPY, COLON, DIAGNOSTIC  2017    EYE SURGERY Bilateral 1995    \"Tightened The Muscles, I Was Cross Eyed\"    HYSTERECTOMY VAGINAL  2000's    SLEEVE GASTRECTOMY  04/02/2018    Laparoscopic and Robotic with hiatal hernia repair    TONSILLECTOMY  2/2016       Current Medications:   Current Facility-Administered Medications   Medication Dose Route Frequency Provider Last Rate Last Admin    ondansetron (ZOFRAN) injection 4 mg  4 mg IntraVENous Q30 Min PRN Korinne Lusterio, PA-C   4 mg at 12/04/21 0116    ketamine (KETALAR) injection 70 mg  70 mg IntraVENous Once Korinne Lusterio, PA-C        sodium chloride flush 0.9 % injection 5-40 mL  5-40 mL IntraVENous 2 times per day Franny Poe MD        sodium chloride flush 0.9 % injection 5-40 mL  5-40 mL IntraVENous PRN Whit Thompson MD        0.9 % sodium chloride infusion  25 mL IntraVENous PRN Whit Thompson MD        enoxaparin (LOVENOX) injection 40 mg  40 mg SubCUTAneous Daily Whit Thompson MD   40 mg at 12/04/21 0833    ondansetron (ZOFRAN-ODT) disintegrating tablet 4 mg  4 mg Oral Q8H PRN Franny Poe MD        Or    ondansetron (ZOFRAN) injection 4 mg  4 mg IntraVENous Q6H PRN Viral Tolliver MD        polyethylene glycol (GLYCOLAX) packet 17 g  17 g Oral Daily PRN Viral Tolliver MD        HYDROmorphone (DILAUDID) injection 1 mg  1 mg IntraVENous Q4H PRN Viral Tolliver MD   1 mg at 12/04/21 0832    acetaminophen (TYLENOL) tablet 650 mg  650 mg Oral Q6H PRN Viral Tolliver MD        Or    acetaminophen (TYLENOL) suppository 650 mg  650 mg Rectal Q6H PRN Viral Tolliver MD        oxyCODONE (ROXICODONE) immediate release tablet 5 mg  5 mg Oral Q4H PRN Viral Tolliver MD        piperacillin-tazobactam (ZOSYN) 3,375 mg in dextrose 5 % 50 mL IVPB extended infusion (mini-bag)  3,375 mg IntraVENous Q8H Isaaczovenancio Thompson MD         Current Outpatient Medications   Medication Sig Dispense Refill    buPROPion (WELLBUTRIN XL) 300 MG extended release tablet Take 300 mg by mouth every morning      nystatin (MYCOSTATIN) 634016 UNIT/GM powder Apply 3 times daily. 1 Bottle 5    buprenorphine 20 MCG/HR PTWK Place onto the skin. [facility] pantoprazole (PROTONIX) 40 MG tablet Take 1 tablet by mouth 2 times daily 60 tablet 3    ondansetron (ZOFRAN ODT) 4 MG disintegrating tablet Take 1 tablet by mouth every 4 hours as needed for Nausea or Vomiting 30 tablet 3    diazepam (VALIUM) 10 MG tablet Take 10 mg by mouth as needed for Anxiety.  oxyCODONE (OXY-IR) 30 MG immediate release tablet Take 15 mg by mouth as needed.  Prn.     [employer] Nutritional Supplements (ENSURE HIGH PROTEIN) LIQD Take 1 Can by mouth daily 32 Can 0    prazosin (MINIPRESS) 5 MG capsule Take 5 mg by mouth nightly      ARIPiprazole (ABILIFY) 30 MG tablet Take 30 mg by mouth nightly       citalopram (CELEXA) 40 MG tablet Take 40 mg by mouth nightly          Allergies:  Morphine and Phenergan [promethazine hcl]    Social History:   Social History     Socioeconomic History    Marital status: Single     Spouse name: None    Number of children: None    Years of education: None    Highest education level: None Occupational History    None   Tobacco Use    Smoking status: Former Smoker     Packs/day: 0.50     Years: 20.00     Pack years: 10.00     Types: Cigarettes     Start date: 0     Quit date: 2017     Years since quittin.3    Smokeless tobacco: Never Used   Vaping Use    Vaping Use: Every day    Substances: Always   Substance and Sexual Activity    Alcohol use: Yes     Alcohol/week: 0.0 standard drinks     Comment: \"Very Rarely, Maybe Once A Year\"    Drug use: No    Sexual activity: Not Currently   Other Topics Concern    None   Social History Narrative    None     Social Determinants of Health     Financial Resource Strain:     Difficulty of Paying Living Expenses: Not on file   Food Insecurity:     Worried About Running Out of Food in the Last Year: Not on file    Kareem of Food in the Last Year: Not on file   Transportation Needs:     Lack of Transportation (Medical): Not on file    Lack of Transportation (Non-Medical):  Not on file   Physical Activity:     Days of Exercise per Week: Not on file    Minutes of Exercise per Session: Not on file   Stress:     Feeling of Stress : Not on file   Social Connections:     Frequency of Communication with Friends and Family: Not on file    Frequency of Social Gatherings with Friends and Family: Not on file    Attends Denominational Services: Not on file    Active Member of [address] [place] [address] or Organizations: Not on file    Attends Club or Organization Meetings: Not on file    Marital Status: Not on file   Intimate Partner Violence:     Fear of Current or Ex-Partner: Not on file    Emotionally Abused: Not on file    Physically Abused: Not on file    Sexually Abused: Not on file   Housing Stability:     Unable to Pay for Housing in the Last Year: Not on file    Number of [place] in the Last Year: Not on file    Unstable Housing in the Last Year: Not on file       Family History:   Family History   Problem Relation Age of Onset    Depression Mother     Alcohol Abuse Father     Substance Abuse Father         Alcoholisim    High Blood Pressure Father     Bipolar Disorder Sister     Depression Sister     Substance Abuse Sister         \"Marijuana\"       REVIEW OF SYSTEMS:    Review of Systems   Constitutional: Negative for chills and fever. HENT: Negative for congestion and sore throat. Eyes: Negative for discharge and redness. Respiratory: Negative for chest tightness and shortness of breath. Cardiovascular: Negative for chest pain and palpitations. Gastrointestinal: Positive for abdominal pain, constipation, diarrhea, nausea and vomiting. Negative for abdominal distention and blood in stool. Genitourinary: Positive for flank pain. Negative for dysuria. Musculoskeletal: Positive for arthralgias and back pain. Negative for myalgias. Skin: Negative for color change and rash. Neurological: Negative for dizziness and numbness. Psychiatric/Behavioral: Negative for confusion. The patient is not nervous/anxious. I have reviewed the patient's information pertinent to this visit, including medical history, family history, social history and review of systems. PHYSICAL EXAM:    Vitals:    12/04/21 0801 12/04/21 0802 12/04/21 0803 12/04/21 0807   BP:    138/74   Pulse:    70   Resp:    18   Temp:    98.3 °F (36.8 °C)   TempSrc:    Oral   SpO2: 96% 96% 96% 99%   Weight:       Height:         Physical Exam  Constitutional:       General: She is not in acute distress. Appearance: She is well-developed. She is obese. She is not diaphoretic. HENT:      Head: Normocephalic and atraumatic. Mouth/Throat:      Mouth: Mucous membranes are moist.   Eyes:      General:         Right eye: No discharge. Left eye: No discharge. Pupils: Pupils are equal, round, and reactive to light. Neck:      Trachea: No tracheal deviation. Cardiovascular:      Rate and Rhythm: Normal rate and regular rhythm.    Pulmonary:      Effort: Pulmonary effort is normal. No respiratory distress. Breath sounds: No wheezing. Abdominal:      General: There is no distension. Palpations: Abdomen is soft. Tenderness: There is abdominal tenderness (periumbilical pain). There is no guarding or rebound. Comments: Well healed laparoscopic scars. Morbidly obese. Musculoskeletal:         General: No tenderness or deformity. Cervical back: Neck supple. Skin:     General: Skin is warm and dry. Findings: No rash. Neurological:      Mental Status: She is alert and oriented to person, place, and time. Psychiatric:         Behavior: Behavior normal.         DATA:    Lab Results   Component Value Date    WBC 8.9 12/03/2021    HGB 14.2 12/03/2021    HCT 42.9 12/03/2021     12/03/2021     (L) 12/03/2021    K 3.6 12/03/2021    CL 96 (L) 12/03/2021    CO2 22 12/03/2021    BUN 9 12/03/2021    CREATININE 0.7 12/03/2021    GLUCOSE 93 12/03/2021    CALCIUM 9.7 12/03/2021    PROT 8.1 12/03/2021    BILITOT 0.8 12/03/2021    AST 37 12/03/2021    ALT 77 (H) 12/03/2021    ALKPHOS 134 (H) 12/03/2021    AMYLASE 44 07/17/2014    LIPASE 31 12/03/2021    INR 0.97 07/25/2017    GLUF 84 06/01/2015    LABA1C 4.7 02/15/2018       Imaging:   CT ABDOMEN PELVIS W IV CONTRAST Additional Contrast? None    Result Date: 12/4/2021  EXAMINATION: CT OF THE ABDOMEN AND PELVIS WITH CONTRAST 12/4/2021 1:15 am TECHNIQUE: CT of the abdomen and pelvis was performed with the administration of intravenous contrast. Multiplanar reformatted images are provided for review. Dose modulation, iterative reconstruction, and/or weight based adjustment of the mA/kV was utilized to reduce the radiation dose to as low as reasonably achievable.  COMPARISON: 06/02/2015 HISTORY: ORDERING SYSTEM PROVIDED HISTORY: generalized abdominal pain TECHNOLOGIST PROVIDED HISTORY: Reason for exam:->generalized abdominal pain Additional Contrast?->None Decision Support Exception - unselect if not a suspected or confirmed emergency medical condition->Emergency Medical Condition (MA) Reason for Exam: generalized abdominal pain FINDINGS: Lower Chest: Lung bases clear. Organs: Diffuse hepatic steatosis. Stable mild splenomegaly measuring 15 cm in the greatest dimension. Unremarkable pancreas, adrenals, and bilateral kidneys. Status post cholecystectomy. GI/Bowel: Interval increase in size of the appendix (0.7 cm in diameter close). Minimal fat stranding around the appendiceal tip, suspicious for acute tip appendicitis. Prior gastric sleeve surgery. Multiple fluid-filled small bowel and proximal colonic loops noted, nonspecific finding which can be seen with acute enterocolitis. Bowel loops nonobstructed. Pelvis: Status posthysterectomy. No adnexal mass. Urinary bladder grossly unremarkable. Peritoneum/Retroperitoneum: No abdominal or pelvic abscess. No free air or free fluid. No adenopathy. Intact abdominal aorta and its major branches. Bones/Soft Tissues: Intact osseous structures. Findings suspicious for acute tip appendicitis in the appropriate clinical setting. No free air or periappendiceal abscess. Multiple fluid-filled small bowel and proximal colonic loops, nonspecific finding which can be seen with acute enterocolitis. Stable mild splenomegaly. I discussed the findings by phone with Dr. Fidencio Romero at 2:34 am on 12/4/2021. Pertinent laboratory and imaging studies were personally reviewed if available.     IMPRESSION:    Denia Bergman is a 43 y.o. female with history of Suboxone use and chronic pain, presenting with abdominal pain, nausea, vomiting, and imaging findings consistent with possible gastroenteritis, and early acute appendicitis  Patient Active Problem List    Diagnosis Date Noted    Acute appendicitis 12/04/2021    Status post bariatric surgery 04/11/2018    Status post laparoscopic sleeve gastrectomy 04/11/2018    Hiatal hernia     Snores 05/05/2016  Fatty liver 05/05/2016    Gastroesophageal reflux disease without esophagitis 05/05/2016    Chronic constipation 05/05/2016    SOB (shortness of breath) on exertion 05/05/2016    Depressed 05/05/2016    Anxiety 05/05/2016    Panic 05/05/2016    Polycystic disease, ovaries 05/05/2016    Bilateral low back pain 05/05/2016    Left knee pain 05/05/2016    H/O cardiovascular stress test 06/02/2014    FHx: coronary artery disease     SVT (supraventricular tachycardia) ([facility])     Chest pain     SOBOE (shortness of breath on exertion)     Palpitations     Internal derangement of right knee 01/13/2012    Morbid obesity ([facility] 01/13/2012     Visit Diagnoses:  1. Appendicitis, unspecified appendicitis type    2. Enterocolitis      PLAN:  · Discussed findings and options with Adrienne Nascimento. Discussed that her imaging findings appear consistent with early appendicitis. Given her clinical history is also consistent with this, it is reasonable to proceed with laparoscopic appendectomy versus observation with antibiotics. She wishes to continue with surgery  · N.p.o. for OR today  · Rapid Covid negative  · Continue empiric antibiotics  · We will discuss the case with the OR desk, and determine if it can be done robotically versus laparoscopically over the weekend. The patient is agreeable to proceed either way. · The risks, benefits, potential complications and possible alternatives of the procedure were discussed in detail, including complications of but not limited to infection, bleeding, anesthesia-related complications, death, abscess, drain placement, conversion to open, need for ileocecectomy, or need for additional interventions. All questions were answered. The patient wished to proceed and consent was documented in the medical record. The patient was counseled at length about the risks of sugey Covid-19 during their perioperative period and any recovery window from their procedure.   The patient was made aware that sugey Covid-19  may worsen their prognosis for recovering from their procedure  and lend to a higher morbidity and/or mortality risk. All material risks, benefits, and reasonable alternatives including postponing the procedure were discussed. The patient does wish to proceed with the procedure at this time.   · Thank you for the consultation and the opportunity to care for Erica    Electronically signed by Nandini Fuentes MD, 12/4/2021, 9:13 AM

## 2021-12-04 NOTE — ED NOTES
Notified MD patient requesting pain medications.   Awaiting response       Scar Weinstein RN  12/04/21 3445

## 2021-12-04 NOTE — H&P
History and Physical      Name:  Denia Bregman /Age/Sex: 1985  (43 y.o. female)   MRN & CSN:  [id number] & [id number] Admission Date/Time: 12/3/2021 10:55 PM   Location:  [facility] PCP: Crissy Jeffries MD       Hospital Day: 2    Assessment and Plan:   Denia Bergman is a 43 y.o.  female with PMH of morbid obesity, COPD, SVT and anxiety who presents with abdominal pain. #Abdominal pain with nausea/vomiting due to acute appendicitis. -General surgery consulted by ER team.  -Adequate analgesics as needed.  -Keep n.p.o. for now  -IV fluid for maintenance hydration. -IV antibiotics with Zosyn and continue other management per general surgery.  -Other than morbid obesity, patient is low risk for low risk surgery. #Morbid obesity with BMI of 47.93.  -Counseled to lose weight.  -Follow-up with PCP for weight reduction management strategies. #COPD is stable. Bronchodilator inhalers and give nebs as needed. #History of SVT. Keep on telemetry. #Anxiety. Give anxiolytics as needed. Patient is also on Abilify and citalopram.        Diet Diet NPO   DVT Prophylaxis [x] Lovenox, []  Heparin, [] SCDs, [] Ambulation   GI Prophylaxis [x] PPI,  [] H2 Blocker,  [] Carafate,  [] Diet/Tube Feeds   Code Status Full Code   Disposition Patient requires continued admission due to acute appendicitis with awaiting general surgery evaluation. MDM [] Low, [] Moderate,[x]  High  Patient's risk as above due to abdominal pain, nausea and vomiting, acute appendicitis, morbid obesity. History of Present Illness:     Chief Complaint: Abdominal pain  Denia Bergman is a 43 y.o.  female  who presents with periumbilical abdominal pain which started yesterday and described as stabbing pain, 10/10 in severity and radiates to her renal angles bilaterally. She reports associated nausea but no vomiting and also has soft stools. She denies any dysuria, frequency of urination or hematuria.   She also reports associated chills and cold sweats but no objective fever. Patient denies any orthopnea, PND or leg swelling. She also denies any chest pain, palpitation, dizziness or syncope. She denies use of anticoagulant and no herbal medications. She also denies any issues with anesthesia in the past or any family history of issues with anesthesia. Patient denies any liver, kidney or heart disease. No history of diabetes. With the worsening of the abdominal pain, she presented to the ER to be evaluated. In the ER, her vital signs has been stable. Blood work is notable for sodium of 131 with chloride of 96. Alk phos of 136 on prior ALT of 77. Otherwise normal labs. CT scan of the abdomen and pelvis revealed findings suggestive of acute tip appendicitis. There is also multiple fluid-filled small bowel and proximal colonic loops suspicious for acute enterocolitis. She received IV fluid normal saline 1 L bolus, dicyclomine IM 20 mg, IV famotidine 20 mg, IV fentanyl 50 mcg, IV ketorolac 30 mg, IV Zosyn 3.375 mg and oral Tylenol 1 g.  General surgery was consulted and hospitalist service was called to admit patient for further management. Ten point ROS reviewed negative, unless as noted above    Objective:   No intake or output data in the 24 hours ending 12/04/21 0848   Vitals:   Vitals:    12/04/21 0807   BP: 138/74   Pulse: 70   Resp: 18   Temp: 98.3 °F (36.8 °C)   SpO2: 99%     Physical Exam:   GEN Awake female, 9 in bed in no apparent distress. Appears given age. Morbidly obese with BMI of 47.93 kg/m²  EYES No scleral erythema, discharge, or conjunctivitis. HENT Mucous membranes are moist. No evidence of thrush. NECK Supple, no apparent thyromegaly or masses. RESP Clear to auscultation, no wheezes, rales or rhonchi. Symmetric chest movement while on room air. CARDIO/VASC S1/S2 auscultated. Regular rate without appreciable murmurs, rubs, or gallops. No JVD or carotid bruits.  Peripheral pulses equal bilaterally and palpable. No peripheral edema. GI Abdomen is soft with significant generalized tenderness worse in the periumbilical area and patient is guarding. No obvious masses and bowel sounds are normoactive.  No costovertebral angle tenderness. Horne catheter is not present. HEME/LYMPH No palpable cervical lymphadenopathy and no hepatosplenomegaly. No petechiae or ecchymoses. MSK No gross joint deformities. SKIN Normal coloration, warm, dry. NEURO Cranial nerves appear grossly intact, normal speech, no lateralizing weakness. PSYCH Awake, alert, oriented x 4. Affect appropriate. Past Medical History:      Past Medical History:   Diagnosis Date    Acid reflux     Anxiety     Arthritis     \"Knees\"    Chronic back pain     Chronic knee pain     COPD (chronic obstructive pulmonary disease) [facility]     Sees Dr. Ruddy Gonzalez    Depression     FHx: coronary artery disease     H/O 24 hour EKG monitoring 5/25/14    24 hr holter monitor. Sinus rhythm.  H/O cardiovascular stress test 06/02/2014    EF 64%, no ischemia, no wall motion abnormality seen, normal perfusion pattern.  H/O echocardiogram 6/02/14 6/14 EF-55% left atrium is mildly dilated.  Hiatal hernia     IBS (irritable bowel syndrome)     Morbid obesity ([facility])     Night terrors     Osteoarthritis     \"Knees\"    Panic attacks     PTSD (post-traumatic stress disorder)     [facility]    \"saw heart  had stress test, 48 hr holter and echo and all turned out fine\"    Teeth missing     Upper    Wears glasses     Wears partial dentures     Upper     PSHX:  has a past surgical history that includes Dilation and curettage of uterus (2011); Tonsillectomy (2/2016); Colonoscopy (2015); Endoscopy, colon, diagnostic (2017); eye surgery (Bilateral, 1995); Dental surgery; Cholecystectomy, laparoscopic (2015); HYSTERECTOMY VAGINAL (2000's); and Sleeve Gastrectomy (04/02/2018).     Allergies: Allergies   Allergen Reactions    Morphine Itching and Nausea And Vomiting    Phenergan [Promethazine Hcl] Shortness Of Breath       FAM HX: family history includes Alcohol Abuse in her father; Bipolar Disorder in her sister; Depression in her mother and sister; High Blood Pressure in her father; Substance Abuse in her father and sister. Soc HX:   Social History     Socioeconomic History    Marital status: Single     Spouse name: None    Number of children: None    Years of education: None    Highest education level: None   Occupational History    None   Tobacco Use    Smoking status: Former Smoker     Packs/day: 0.50     Years: 20.00     Pack years: 10.00     Types: Cigarettes     Start date: 0     Quit date: 2017     Years since quittin.3    Smokeless tobacco: Never Used   Vaping Use    Vaping Use: Every day    Substances: Always   Substance and Sexual Activity    Alcohol use: Yes     Alcohol/week: 0.0 standard drinks     Comment: \"Very Rarely, Maybe Once A Year\"    Drug use: No    Sexual activity: Not Currently   Other Topics Concern    None   Social History Narrative    None     Social Determinants of Health     Financial Resource Strain:     Difficulty of Paying Living Expenses: Not on file   Food Insecurity:     Worried About Running Out of Food in the Last Year: Not on file    Kareem of Food in the Last Year: Not on file   Transportation Needs:     Lack of Transportation (Medical): Not on file    Lack of Transportation (Non-Medical):  Not on file   Physical Activity:     Days of Exercise per Week: Not on file    Minutes of Exercise per Session: Not on file   Stress:     Feeling of Stress : Not on file   Social Connections:     Frequency of Communication with Friends and Family: Not on file    Frequency of Social Gatherings with Friends and Family: Not on file    Attends Yarsanism Services: Not on file    Active Member of Clubs or Organizations: Not on file   [place] Attends Club or Organization Meetings: Not on file    Marital Status: Not on file   Intimate Partner Violence:     Fear of Current or Ex-Partner: Not on file    Emotionally Abused: Not on file    Physically Abused: Not on file    Sexually Abused: Not on file   Housing Stability:     Unable to Pay for Housing in the Last Year: Not on file    Number of Places Lived in the Last Year: Not on file    Unstable Housing in the Last Year: Not on file       Medications:   Medications:    ketamine  70 mg IntraVENous Once    sodium chloride flush  5-40 mL IntraVENous 2 times per day    enoxaparin  40 mg SubCUTAneous Daily      Infusions:    sodium chloride       PRN Meds: ondansetron, 4 mg, Q30 Min PRN  sodium chloride flush, 5-40 mL, PRN  sodium chloride, 25 mL, PRN  ondansetron, 4 mg, Q8H PRN   Or  ondansetron, 4 mg, Q6H PRN  polyethylene glycol, 17 g, Daily PRN  HYDROmorphone, 1 mg, Q4H PRN  acetaminophen, 650 mg, Q6H PRN   Or  acetaminophen, 650 mg, Q6H PRN  oxyCODONE, 5 mg, Q4H PRN          Electronically signed by Hemalatha Floyd MD on 12/4/2021 at 8:48 AM

## 2021-12-04 NOTE — ED PROVIDER NOTES
eMERGENCY dEPARTMENT eNCOUnter         [address] [facility]     PCP: Martha Lee MD    CHIEF COMPLAINT    Chief Complaint   Patient presents with    Abdominal Pain     sharp pain around belly button - 10/10, hx of blockage        HPI    Chetna Perry is a 43 y.o. female who presents with abdominal pain nausea and vomiting. Onset was prior to arrival, acute on chronic. Context is patient states that she is on chronic pain management with Suboxone for the last several years secondary to chronic hip and knee pain, awaiting surgical management. States she also has a history of IBS with chronic constipation which is exacerbated from her long-term Suboxone use. Patient states that she began having sharp cramping stabbing pain, 72/57 to the periumbilical area with nausea vomiting and loose nonbloody stools. She states pain is nonradiating into her flank/kidney area without dysuria hematuria. She has had no improvement of symptoms at home with her Suboxone therapy but states \"I have taken it for so long, it does not really control my pain. \"  No fever or chills. No new foods medications or antibiotics. Has had previous cholecystectomy, hysterectomy and gastric bypass. No history of bowel obstructions. Denying any chest pain shortness of breath. No known alleviating or exacerbating factors. No abnormal vaginal discharge, odor or concern for STI. Patient states that she has not taken her Suboxone since Tuesday. REVIEW OF SYSTEMS    Constitutional:  Denies fever, chills, weight loss or weakness   HENT:  Denies sore throat or ear pain   Cardiovascular:  Denies chest pain, palpitations or swelling   Respiratory:  Denies cough or shortness of breath   GI:  See HPI above  : No hematuria or dysuria. No vaginal symptoms. Musculoskeletal:  Denies back pain or groin pain or masses. No pain or swelling of extremities.   Skin:  Denies rash  Neurologic: Denies headache, focal weakness or sensory changes   Endocrine:  Denies polyuria or polydypsia   Lymphatic:  Denies swollen glands     All other review of systems are negative  See HPI and nursing notes for additional information     PAST MEDICAL & SURGICAL HISTORY    Past Medical History:   Diagnosis Date    Acid reflux     Anxiety     Arthritis     \"Knees\"    Chronic back pain     Chronic knee pain     COPD (chronic obstructive pulmonary disease) ([facility])     Sees Dr. Leo Ovalle    Depression     FHx: coronary artery disease     H/O 24 hour EKG monitoring 5/25/14    24 hr holter monitor. Sinus rhythm.  H/O cardiovascular stress test 06/02/2014    EF 64%, no ischemia, no wall motion abnormality seen, normal perfusion pattern.  H/O echocardiogram 6/02/14 6/14 EF-55% left atrium is mildly dilated.     Hiatal hernia     IBS (irritable bowel syndrome)     Morbid obesity ([facility])     Night terrors     Osteoarthritis     \"Knees\"    Panic attacks     PTSD (post-traumatic stress disorder)     SVT (supraventricular tachycardia) ([address] 05/2014    \"saw heart  had stress test, 48 hr holter and echo and all turned out fine\"    Teeth missing     Upper    Wears glasses     Wears partial dentures     Upper     Past Surgical History:   Procedure Laterality Date    CHOLECYSTECTOMY, LAPAROSCOPIC  2015    COLONOSCOPY  2015    DENTAL SURGERY      Teeth Extracted In Past    DILATION AND CURETTAGE OF UTERUS  2011    ENDOSCOPY, COLON, DIAGNOSTIC  2017    EYE SURGERY Bilateral 1995    \"Tightened The Muscles, I Was Cross Eyed\"    HYSTERECTOMY VAGINAL  2000's    SLEEVE GASTRECTOMY  04/02/2018    Laparoscopic and Robotic with hiatal hernia repair    TONSILLECTOMY  2/2016       CURRENT MEDICATIONS    Current Outpatient Rx   Medication Sig Dispense Refill    buPROPion (WELLBUTRIN XL) 300 MG extended release tablet Take 300 mg by mouth every morning      nystatin (MYCOSTATIN) 790440 UNIT/GM powder Apply 3 times daily. 1 Bottle 5    buprenorphine 20 MCG/HR PTWK Place onto the skin. Lyric Olguin pantoprazole (PROTONIX) 40 MG tablet Take 1 tablet by mouth 2 times daily 60 tablet 3    ondansetron (ZOFRAN ODT) 4 MG disintegrating tablet Take 1 tablet by mouth every 4 hours as needed for Nausea or Vomiting 30 tablet 3    diazepam (VALIUM) 10 MG tablet Take 10 mg by mouth as needed for Anxiety.  oxyCODONE (OXY-IR) 30 MG immediate release tablet Take 15 mg by mouth as needed. Prn.     Rosie Sargent Nutritional Supplements (ENSURE HIGH PROTEIN) LIQD Take 1 Can by mouth daily 32 Can 0    prazosin (MINIPRESS) 5 MG capsule Take 5 mg by mouth nightly      ARIPiprazole (ABILIFY) 30 MG tablet Take 30 mg by mouth nightly       citalopram (CELEXA) 40 MG tablet Take 40 mg by mouth nightly          ALLERGIES    Allergies   Allergen Reactions    Morphine Itching and Nausea And Vomiting    Phenergan [Promethazine Hcl] Shortness Of Breath       SOCIAL AND FAMILY HISTORY    Social History     Socioeconomic History    Marital status: Single     Spouse name: None    Number of children: None    Years of education: None    Highest education level: None   Occupational History    None   Tobacco Use    Smoking status: Former Smoker     Packs/day: 0.50     Years: 20.00     Pack years: 10.00     Types: Cigarettes     Start date: 0     Quit date: 2017     Years since quittin.3    Smokeless tobacco: Never Used   Vaping Use    Vaping Use: Former    Substances: Always   Substance and Sexual Activity    Alcohol use:  Yes     Alcohol/week: 0.0 standard drinks     Comment: \"Very Rarely, Maybe Once A Year\"    Drug use: No    Sexual activity: Not Currently   Other Topics Concern    None   Social History Narrative    None     Social Determinants of Health     Financial Resource Strain:     Difficulty of Paying Living Expenses: Not on file   Food Insecurity:     Worried About Running Out of Food in the Last Year: Not on file    Kareem of Food in the Last Year: Not on file   Transportation Needs:     Lack of Transportation (Medical): Not on file    Lack of Transportation (Non-Medical): Not on file   Physical Activity:     Days of Exercise per Week: Not on file    Minutes of Exercise per Session: Not on file   Stress:     Feeling of Stress : Not on file   Social Connections:     Frequency of Communication with Friends and Family: Not on file    Frequency of Social Gatherings with Friends and Family: Not on file    Attends Scientology Services: Not on file    Active Member of Clubs or Organizations: Not on file    Attends Club or Organization Meetings: Not on file    Marital Status: Not on file   Intimate Partner Violence:     Fear of Current or Ex-Partner: Not on file    Emotionally Abused: Not on file    Physically Abused: Not on file    Sexually Abused: Not on file   Housing Stability:     Unable to Pay for Housing in the Last Year: Not on file    Number of [place] in the Last Year: Not on file    Unstable Housing in the Last Year: Not on file     Family History   Problem Relation Age of Onset    Depression Mother     Alcohol Abuse Father     Substance Abuse Father         Alcoholisim    High Blood Pressure Father     Bipolar Disorder Sister     Depression Sister     Substance Abuse Sister         \"Marijuana\"       PHYSICAL EXAM    VITAL SIGNS: BP (!) 140/93   Pulse 97   Temp 98.3 °F (36.8 °C) (Oral)   Resp 22   Ht 5' 7\" (1.702 m)   Wt (!) 306 lb (138.8 kg)   LMP 07/30/2014 (Approximate)   SpO2 100%   BMI 47.93 kg/m²   General:  Well developed, elevated BMI, uncomfortable appearing, texting on cell phone, no acute distress.    Eyes:  Sclera nonicteric, Conjunctiva moist, No discharge  Head:  Normocephalic, Atramautic  Neck/Lymphatics: Supple, no JVD, no swollen nodes  Respiratory:  Clear to ausculation bilaterally, No retractions, Non labored breathing  Cardiovascular:  RRR, No murmurs/gallops/thrills  GI:   No gross discoloration. Bowel sounds present in all quadrants, No audible bruits. Soft obese abdomen, nondistended, no fluid wave. Localized moderate tenderness in the periumbilical region to deep palpation ,without rebound tenderness or guarding, No palpable pulsatile masses or obvious hernias. No McBurney's point tenderness  Negative Rovsing sign   Negative Ball's sign. Back:  No CVA tenderness to percussion.   Musculoskeletal:  No edema, No deformity  Peripheral Vascular: Distal pulses 2+ equal bilaterally  Integument: No rash, Normal turgor  Neurologic:  Alert & oriented, Normal speech  Psychiatric: Cooperative, pleasant affect       I have reviewed and interpreted all of the currently available lab results from this visit (if applicable):  Results for orders placed or performed during the hospital encounter of 12/03/21   COVID-19, Rapid    Specimen: Nasopharyngeal   Result Value Ref Range    Source THROAT     SARS-CoV-2, NAAT NOT DETECTED NOT DETECTED   CBC auto diff   Result Value Ref Range    WBC 8.9 4.0 - 10.5 K/CU MM    RBC 4.97 4.2 - 5.4 M/CU MM    Hemoglobin 14.2 12.5 - 16.0 GM/DL    Hematocrit 42.9 37 - 47 %    MCV 86.3 78 - 100 FL    MCH 28.6 27 - 31 PG    MCHC 33.1 32.0 - 36.0 %    RDW 12.8 11.7 - 14.9 %    Platelets 697 704 - 016 K/CU MM    MPV 8.9 7.5 - 11.1 FL    Differential Type AUTOMATED DIFFERENTIAL     Segs Relative 83.6 (H) 36 - 66 %    Lymphocytes % 11.6 (L) 24 - 44 %    Monocytes % 3.9 0 - 4 %    Eosinophils % 0.4 0 - 3 %    Basophils % 0.3 0 - 1 %    Segs Absolute 7.4 K/CU MM    Lymphocytes Absolute 1.0 K/CU MM    Monocytes Absolute 0.4 K/CU MM    Eosinophils Absolute 0.0 K/CU MM    Basophils Absolute 0.0 K/CU MM    Nucleated RBC % 0.0 %    Total Nucleated RBC 0.0 K/CU MM    Total Immature Neutrophil 0.02 K/CU MM    Immature Neutrophil % 0.2 0 - 0.43 %   CMP   Result Value Ref Range    Sodium 131 (L) 135 - 145 MMOL/L    Potassium 3.6 3.5 - 5.1 MMOL/L    Chloride 96 (L) 99 - 110 mMol/L CO2 22 21 - 32 MMOL/L    BUN 9 6 - 23 MG/DL    CREATININE 0.7 0.6 - 1.1 MG/DL    Glucose 93 70 - 99 MG/DL    Calcium 9.7 8.3 - 10.6 MG/DL    Albumin 4.7 3.4 - 5.0 GM/DL    Total Protein 8.1 6.4 - 8.2 GM/DL    Total Bilirubin 0.8 0.0 - 1.0 MG/DL    ALT 77 (H) 10 - 40 U/L    AST 37 15 - 37 IU/L    Alkaline Phosphatase 134 (H) 40 - 129 IU/L    GFR Non-African American >60 >60 mL/min/1.73m2    GFR African American >60 >60 mL/min/1.73m2    Anion Gap 13 4 - 16   Lipase   Result Value Ref Range    Lipase 31 13 - 60 IU/L   Urinalysis   Result Value Ref Range    Color, UA YELLOW YELLOW    Clarity, UA HAZY (A) CLEAR    Glucose, Urine NEGATIVE NEGATIVE MG/DL    Bilirubin Urine NEGATIVE NEGATIVE MG/DL    Ketones, Urine NEGATIVE NEGATIVE MG/DL    Specific Gravity, UA 1.010 1.001 - 1.035    Blood, Urine NEGATIVE NEGATIVE    pH, Urine 7.0 5.0 - 8.0    Protein, UA NEGATIVE NEGATIVE MG/DL    Urobilinogen, Urine NEGATIVE 0.2 - 1.0 MG/DL    Nitrite Urine, Quantitative NEGATIVE NEGATIVE    Leukocyte Esterase, Urine NEGATIVE NEGATIVE    RBC, UA NONE SEEN 0 - 6 /HPF    WBC, UA NONE SEEN 0 - 5 /HPF    Bacteria, UA NEGATIVE NEGATIVE /HPF    Squam Epithel, UA 16 /HPF    Trichomonas, UA NONE SEEN NONE SEEN /HPF    Amorphous, UA RARE /HPF        RADIOLOGY/PROCEDURES            CT ABDOMEN PELVIS W IV CONTRAST Additional Contrast? None (Final result)  Result time 12/04/21 02:34:24  Final result by Cole Naqvi MD (12/04/21 02:34:24)                Impression:    Findings suspicious for acute tip appendicitis in the appropriate clinical   setting. No free air or periappendiceal abscess. Multiple fluid-filled small bowel and proximal colonic loops, nonspecific   finding which can be seen with acute enterocolitis. Stable mild splenomegaly. I discussed the findings by phone with Dr. Ramiro Loyd at 2:34 am on   12/4/2021.              Narrative:    EXAMINATION:   CT OF THE ABDOMEN AND PELVIS WITH CONTRAST 12/4/2021 1:15 am TECHNIQUE:   CT of the abdomen and pelvis was performed with the administration of   intravenous contrast. Multiplanar reformatted images are provided for review. Dose modulation, iterative reconstruction, and/or weight based adjustment of   the mA/kV was utilized to reduce the radiation dose to as low as reasonably   achievable. COMPARISON:   06/02/2015     HISTORY:   ORDERING SYSTEM PROVIDED HISTORY: generalized abdominal pain   TECHNOLOGIST PROVIDED HISTORY:   Reason for exam:->generalized abdominal pain   Additional Contrast?->None   Decision Support Exception - unselect if not a suspected or confirmed   emergency medical condition->Emergency Medical Condition (MA)   Reason for Exam: generalized abdominal pain     FINDINGS:   Lower Chest: Lung bases clear. Organs: Diffuse hepatic steatosis.  Stable mild splenomegaly measuring 15 cm   in the greatest dimension.  Unremarkable pancreas, adrenals, and bilateral   kidneys.  Status post cholecystectomy. GI/Bowel: Interval increase in size of the appendix (0.7 cm in diameter   close).  Minimal fat stranding around the appendiceal tip, suspicious for   acute tip appendicitis.  Prior gastric sleeve surgery.  Multiple fluid-filled   small bowel and proximal colonic loops noted, nonspecific finding which can   be seen with acute enterocolitis.  Bowel loops nonobstructed. Pelvis: Status posthysterectomy.  No adnexal mass.  Urinary bladder grossly   unremarkable. Peritoneum/Retroperitoneum: No abdominal or pelvic abscess.  No free air or   free fluid.  No adenopathy.  Intact abdominal aorta and its major branches. Bones/Soft Tissues: Intact osseous structures. ED COURSE & MEDICAL DECISION MAKING      Vital signs and nursing notes reviewed during ED course. I have independently evaluated this patient .   Supervising MD - Dr Emilee Wolf - present in the Emergency Department, available for consultation, throughout entirety of patient care. All pertinent Lab data and radiographic results reviewed with patient at bedside. The patient and / or the family were informed of the results of any tests, a time was given to answer questions, a plan was proposed and they agreed with plan. Differential diagnosis: Abdominal Aortic Aneurysm, Ischemic Bowel, Bowel Obstruction, Acute Cholecystitis, Acute Appendicitis, other    Clinical  IMPRESSION    1. Appendicitis, unspecified appendicitis type    2. Enterocolitis          Patient presents with acute on chronic generalized abdominal pain nausea vomiting diarrhea. On exam, nontoxic 40-year-old female, mildly uncomfortable appearing, no acute respiratory distress. She is afebrile, vitals are stable. Unremarkable cardiopulmonary exam.  Abdomen is obese, soft nondistended with localized tenderness to palpation in the periumbilical region. No localized increase tenderness in the right lower quadrant. No peritoneal signs. No CVA tenderness. Patient start IV fluids, Zofran, Pepcid and Toradol. CBC with normal white count of 8.9 with left shift. Normal hemoglobin. CMP with mildly elevated ALT of 77, mild hyponatremia 131, normal renal function and LFTs. UA is unremarkable. Lipase is normal.  CT of the pelvis with IV contrast shows suspicious findings for acute tip appendicitis without free air or periappendiceal abscess. There is also multiple fluid-filled small bowel and proximal colonic loops history for acute enterocolitis with stable mild splenomegaly. After additional Bentyl and fentanyl, patient states pain is improved and 8/10. With patient periumbilical pain and CT findings, plan to consult with general surgeon but I do anticipate admission to hospitalist service for serial abdominal exams and general surgery evaluation.     I did consult with Dr. Maryana Snell - general surgery - and discussed patient's history, ED presentation/course including any pertinent laboratory findings and imaging study findings. He/she agrees with plan for admission, request rapid Covid, n.p.o., empiric Zosyn and will evaluate patient in the morning to determine need for surgical management. Plan for admission was discussed with patient who verbalizes understanding agreement, I then spoke with   Dr. Austyn Avendaño who agrees to hospital admission. Patient is admitted to the hospital in stable condition. In consideration of current COVID19 pandemic, with effort to minimize unnecessary provider exposure, this patient was seen at bedside by me independently. However, in compliance with current hospital WEND[facility]/ED protocol, prior to admission I did discuss this patient case with emergency department physician, Dr. Alfonso Mckeon, who did agree with ED workup/evaluation and plan for admission however but ED attending physician did not independently evaluate the patient. Of note, this Pt was NOT admitted to the ICU. Comment: Please note this report has been produced using speech recognition software and may contain errors related to that system including errors in grammar, punctuation, and spelling, as well as words and phrases that may be inappropriate. If there are any questions or concerns please feel free to contact the dictating provider for clarification.           Kvng Nielsen PA-C  12/04/21 [id number]

## 2021-12-04 NOTE — OP NOTE
[address] [place] [address] Physicians    PATIENT: Carlos Gregorio 1985   MRN: [id number]    DATE: 12/4/2021    SURGEON: Nandini Fuentes MD    CASE ID: [id number]     PROCEDURE(S) PERFORMED:      APPENDECTOMY LAPAROSCOPIC: 13252 (CPT®)    PREOPERATIVE DIAGNOSIS:  abdominal pain, acute appendicitis    POSTOPERATIVE DIAGNOSIS:   same    INDICATIONS: Carlos Gregorio is a 43 y.o. female presenting with abdominal pain and findings consistent with acute appendicitis for which laparoscopic appendectomy has been discussed. The risks, benefits, potential complications and possible alternatives of the procedure were discussed in detail, including complications of but not limited to infection, bleeding, anesthesia-related complications, death, injury to surrounding structures, pain, abscess, drain placement, or need for additional interventions. All questions were answered. The patient wished to proceed and consent was documented in the medical record. FINDINGS:   Early acute appendicitis without rupture or abscess    ANESTHESIA:   General endotracheal anesthesia  Anesthesiologist: Sailaja Ewing MD  CRNA: OLY Velasco - CRNA    STAFF:   Scrub Person First: Miah Su    First Assistant: OLY Corona-CNP   The use of a first assistant was necessary for the proper positioning, prepping, and draping of the patient, as well as the safe and expeditious execution of the case and closure of skin and subcutaneous tissues. ESTIMATED BLOOD LOSS: Minimal    SPECIMEN(S):   ID Type Source Tests Collected by Time Destination   A : appendix Tissue Appendix SURGICAL PATHOLOGY Natacha Mejia MD 12/4/2021 1407        DRAINS & IMPLANTS:  * No implants in log *     COMPLICATIONS: none    DISPOSITION: PACU - hemodynamically stable.      CONDITION: stable     PROCEDURE IN DETAIL:  Prior to beginning the procedure, informed consent was obtained and consent was documented in the medical record. The patient was brought to the operating room and positioned supine on the operating table. Anesthesia was initiated and a time out was performed in which all were in agreement. Appropriate perioperative antibiotics were administered and the patient was prepped and draped in the usual sterile fashion. A Veress needle was used to enter the abdomen in the left lower quadrant without apparent injury. Aspiration produced no fluid, and saline dropped easily into the abdomen through the needle. Pneumoperitoneum was then achieved to 15mmHg. The intended trocar sites were anesthetized with 0.5% Marcaine. A 5mm Visiport trocar was used to enter the abdomen in the left lower quadrant and pneumoperitoneum achieved. There was no apparent injury. Subsequent 5mm periumbilical and 02YJ suprapubic trocar sites were placed under laparoscopic visualization. The patient was placed in Trendelenburg position with the right side tilted up. The small intestines were retracted away from the pelvis and right lower quadrant. The patient was found to have an enlarged and inflamed appendix. There was no evidence of perforation or abscess. The appendix was carefully grasped and a window was dissected in the mesoappendix at the base of the appendix. A vascular load endo-BE stapler was fired across the mesoappendix. The appendix was divided at its base using a blue load endo-BE stapler, and removed from the abdomen with an EndoCatch bag, then sent for pathology. Minimal appendiceal stump was left in place. 5mm clips were used along the staple line for additional hemostasis. There was no evidence of bleeding, leakage, or complication after division of the appendix. A drain was not required. The suprapubic trocar site was closed using 0-Vicryl suture in interrupted fashion at the level of the fascia using a Urmila Burly. The trocar site incisions were closed using 4-0 Vicryl suture in subcuticular fashion. The procedure was concluded. The skin was washed and dried and sterile wound sealant applied. The patient tolerated the procedure well with no apparent complications and was transferred to PACU - hemodynamically stable. Counts were reported correct at the end of the case. I was present and primarily performed all critical portions of the case.        Electronically signed:   Eric Ramirez MD 12/4/2021 3:03 PM

## 2021-12-04 NOTE — ANESTHESIA PRE PROCEDURE
Department of Anesthesiology  Preprocedure Note       Name:  Prem Tao   Age:  43 y.o.  :  1985                                          MRN:  [id number]         Date:  2021      Surgeon: Jessie Mohs):  Asael Sandra MD    Procedure: Procedure(s):  APPENDECTOMY LAPAROSCOPIC    Medications prior to admission:   Prior to Admission medications    Medication Sig Start Date End Date Taking? Authorizing Provider   buPROPion (WELLBUTRIN XL) 300 MG extended release tablet Take 300 mg by mouth every morning    Historical Provider, MD   nystatin (MYCOSTATIN) 808582 UNIT/GM powder Apply 3 times daily. 10/31/18   Jose York MD   buprenorphine 20 MCG/HR PTWK Place onto the skin. Paco Goff Historical Provider, MD   pantoprazole (PROTONIX) 40 MG tablet Take 1 tablet by mouth 2 times daily 18   Jose York MD   ondansetron (ZOFRAN ODT) 4 MG disintegrating tablet Take 1 tablet by mouth every 4 hours as needed for Nausea or Vomiting 18   Jose York MD   diazepam (VALIUM) 10 MG tablet Take 10 mg by mouth as needed for Anxiety. Historical Provider, MD   oxyCODONE (OXY-IR) 30 MG immediate release tablet Take 15 mg by mouth as needed.  Prn. 18   Historical Provider, MD   Nutritional Supplements (ENSURE HIGH PROTEIN) LIQD Take 1 Can by mouth daily 17   OLY Roach - CNP   prazosin (MINIPRESS) 5 MG capsule Take 5 mg by mouth nightly    Historical Provider, MD   ARIPiprazole (ABILIFY) 30 MG tablet Take 30 mg by mouth nightly  3/23/16   Historical Provider, MD   citalopram (CELEXA) 40 MG tablet Take 40 mg by mouth nightly     Historical Provider, MD       Current medications:    Current Facility-Administered Medications   Medication Dose Route Frequency Provider Last Rate Last Admin    ondansetron (ZOFRAN) injection 4 mg  4 mg IntraVENous Q30 Min PRN Korinne Lusterio, PA-C   4 mg at 21 0116    ketamine (KETALAR) injection 70 mg  70 mg IntraVENous Once [place] MONICA Reeves        sodium chloride flush 0.9 % injection 5-40 mL  5-40 mL IntraVENous 2 times per day Claudetta Dowdy, MD        sodium chloride flush 0.9 % injection 5-40 mL  5-40 mL IntraVENous PRN Claudetta Dowdy, MD        0.9 % sodium chloride infusion  25 mL IntraVENous PRN Claudetta Dowdy, MD        enoxaparin (LOVENOX) injection 40 mg  40 mg SubCUTAneous Daily Whit Thompson MD   40 mg at 12/04/21 0833    ondansetron (ZOFRAN-ODT) disintegrating tablet 4 mg  4 mg Oral Q8H PRN Claudetta Dowdy, MD        Or    ondansetron (ZOFRAN) injection 4 mg  4 mg IntraVENous Q6H PRN Claudetta Dowdy, MD        polyethylene glycol (GLYCOLAX) packet 17 g  17 g Oral Daily PRN Claudetta Dowdy, MD        HYDROmorphone (DILAUDID) injection 1 mg  1 mg IntraVENous Q4H PRN Claudetta Dowdy, MD   1 mg at 12/04/21 0832    acetaminophen (TYLENOL) tablet 650 mg  650 mg Oral Q6H PRN Claudetta Dowdy, MD        Or    acetaminophen (TYLENOL) suppository 650 mg  650 mg Rectal Q6H PRN Claudetta Dowdy, MD        oxyCODONE (ROXICODONE) immediate release tablet 5 mg  5 mg Oral Q4H PRN Claudetta Dowdy, MD   5 mg at 12/04/21 0916    piperacillin-tazobactam (ZOSYN) 3,375 mg in dextrose 5 % 50 mL IVPB extended infusion (mini-bag)  3,375 mg IntraVENous Q8H Whit Thompson MD        sodium chloride flush 0.9 % injection 5-40 mL  5-40 mL IntraVENous 2 times per day Alisa Burnham MD        sodium chloride flush 0.9 % injection 5-40 mL  5-40 mL IntraVENous PRN Alisa Burnham MD        0.9 % sodium chloride infusion  25 mL IntraVENous PRN Alisa Burnham MD        lactated ringers infusion   IntraVENous Continuous Alisa Burnham MD        meperidine (DEMEROL) injection 12.5 mg  12.5 mg IntraVENous Q5 Min PRN Chicho Londono MD        fentaNYL (SUBLIMAZE) injection 50 mcg  50 mcg IntraVENous Q5 Min PRN Chicho Londono MD        HYDROmorphone (DILAUDID) injection 0.5 mg  0.5 mg 12/04/21 1455       Allergies: Allergies   Allergen Reactions    Morphine Itching and Nausea And Vomiting    Phenergan [Promethazine Hcl] Shortness Of Breath       Problem List:    Patient Active Problem List   Diagnosis Code    Internal derangement of right knee M23.91    Morbid obesity ([facility]) E66.01    FHx: coronary artery disease Z82.49    SVT (supraventricular tachycardia) ([facility]) I47.1    Chest pain R07.9    SOBOE (shortness of breath on exertion) R06.02    Palpitations R00.2    H/O cardiovascular stress test Z92.89    Snores R06.83    Fatty liver K76.0    Gastroesophageal reflux disease without esophagitis K21.9    Chronic constipation K59.09    SOB (shortness of breath) on exertion R06.02    Depressed F32. A    Anxiety F41.9    Panic F41.0    Polycystic disease, ovaries E28.2    Bilateral low back pain M54.50    Left knee pain M25.562    Hiatal hernia K44.9    Status post bariatric surgery Z98.84    Status post laparoscopic sleeve gastrectomy Z98.84    Acute appendicitis K35.80       Past Medical History:        Diagnosis Date    Acid reflux     Anxiety     Arthritis     \"Knees\"    Chronic back pain     Chronic knee pain     COPD (chronic obstructive pulmonary disease) ([facility])     Sees Dr. Rachel Burks    Depression     FHx: coronary artery disease     H/O 24 hour EKG monitoring 5/25/14    24 hr holter monitor. Sinus rhythm.  H/O cardiovascular stress test 06/02/2014    EF 64%, no ischemia, no wall motion abnormality seen, normal perfusion pattern.  H/O echocardiogram 6/02/14 6/14 EF-55% left atrium is mildly dilated.     Hiatal hernia     IBS (irritable bowel syndrome)     Morbid obesity ([facility])     Night terrors     Osteoarthritis     \"Knees\"    Panic attacks     PTSD (post-traumatic stress disorder)     SVT (supraventricular tachycardia) ([facility] [address] 05/2014    \"saw heart dr had stress test, 48 hr holter and echo and all turned out fine\"    Teeth missing     Upper  Wears glasses     Wears partial dentures     Upper       Past Surgical History:        Procedure Laterality Date    CHOLECYSTECTOMY, LAPAROSCOPIC  2015    COLONOSCOPY  2015    DENTAL SURGERY      Teeth Extracted In Past    DILATION AND CURETTAGE OF UTERUS      ENDOSCOPY, COLON, DIAGNOSTIC      EYE SURGERY Bilateral     \"Tightened The Muscles, I Was Cross Eyed\"    HYSTERECTOMY VAGINAL  's    SLEEVE GASTRECTOMY  2018    Laparoscopic and Robotic with hiatal hernia repair    TONSILLECTOMY  2016       Social History:    Social History     Tobacco Use    Smoking status: Former Smoker     Packs/day: 0.50     Years: 20.00     Pack years: 10.00     Types: Cigarettes     Start date:      Quit date: 2017     Years since quittin.3    Smokeless tobacco: Never Used   Substance Use Topics    Alcohol use: Yes     Alcohol/week: 0.0 standard drinks     Comment: \"Very Rarely, Maybe Once A Year\"                                Counseling given: Not Answered      Vital Signs (Current):   Vitals:    21 0809 21 1009 21 1010 21 1248   BP: 138/74   134/76   Pulse:    72   Resp:    16   Temp:    97.7 °F (36.5 °C)   TempSrc:    Oral   SpO2: 99% 97% 97% 99%   Weight:       Height:                                                  BP Readings from Last 3 Encounters:   21 134/76   21 135/87   20 136/84       NPO Status: Time of last liquid consumption: 0900 (sips with med)                        Time of last solid consumption: 1900                        Date of last liquid consumption: 21                        Date of last solid food consumption: 21    BMI:   Wt Readings from Last 3 Encounters:   21 (!) 306 lb (138.8 kg)   20 260 lb (117.9 kg)   10/31/18 258 lb 3.2 oz (117.1 kg)     Body mass index is 47.93 kg/m².     CBC:   Lab Results   Component Value Date    WBC 8.9 2021    RBC 4.97 2021    HGB 14.2 2021 HCT 42.9 12/03/2021    MCV 86.3 12/03/2021    RDW 12.8 12/03/2021     12/03/2021       CMP:   Lab Results   Component Value Date     12/03/2021    K 3.6 12/03/2021    CL 96 12/03/2021    CO2 22 12/03/2021    BUN 9 12/03/2021    CREATININE 0.7 12/03/2021    GFRAA >60 12/03/2021    LABGLOM >60 12/03/2021    GLUCOSE 93 12/03/2021    PROT 8.1 12/03/2021    PROT 7.1 02/15/2018    CALCIUM 9.7 12/03/2021    BILITOT 0.8 12/03/2021    ALKPHOS 134 12/03/2021    AST 37 12/03/2021    ALT 77 12/03/2021       POC Tests: No results for input(s): POCGLU, POCNA, POCK, POCCL, POCBUN, POCHEMO, POCHCT in the last 72 hours. Coags:   Lab Results   Component Value Date    PROTIME 11.0 07/25/2017    PROTIME 11.2 06/19/2011    INR 0.97 07/25/2017    APTT 24.4 07/25/2017       HCG (If Applicable):   Lab Results   Component Value Date    PREGTESTUR NEGATIVE 07/25/2017        ABGs: No results found for: PHART, PO2ART, KZL5DHV, CSR0SUL, BEART, P5KVXGHE     Type & Screen (If Applicable):  No results found for: LABABO, LABRH    Drug/Infectious Status (If Applicable):  No results found for: HIV, HEPCAB    COVID-19 Screening (If Applicable):   Lab Results   Component Value Date    COVID19 NOT DETECTED 12/04/2021           Anesthesia Evaluation    Airway: Mallampati: II  TM distance: >3 FB   Neck ROM: full  Mouth opening: > = 3 FB Dental:    (+) edentulous      Pulmonary:normal exam    (+) COPD:  shortness of breath:                             Cardiovascular:    (+) ROWE:,                   Neuro/Psych:   (+) psychiatric history:            GI/Hepatic/Renal:   (+) hiatal hernia, GERD:, liver disease:,           Endo/Other:                     Abdominal:             Vascular: Other Findings:             Anesthesia Plan      general     ASA 3       Induction: intravenous. MIPS: Postoperative opioids intended. Anesthetic plan and risks discussed with patient. Plan discussed with CRNA.     Attending anesthesiologist reviewed and agrees with Pre Eval content              Aren Mendoza MD   12/4/2021

## 2021-12-04 NOTE — PROGRESS NOTES
1518: Arrived to PACU from OR. Monitors applied, alarms on. Report obtained from [facility] access hospital and [address].  8734: Medicated for abdominal discomfort. 1609: Turned and repositioned in bed, warm blankets on. Ice chips given per request.  6282: Dozing. 1731: Transported to room 1104.  Belongings with patient

## 2021-12-05 VITALS
SYSTOLIC BLOOD PRESSURE: 127 MMHG | HEART RATE: 71 BPM | BODY MASS INDEX: 45.99 KG/M2 | HEIGHT: 67 IN | TEMPERATURE: 97.5 F | RESPIRATION RATE: 18 BRPM | WEIGHT: 293 LBS | OXYGEN SATURATION: 95 % | DIASTOLIC BLOOD PRESSURE: 83 MMHG

## 2021-12-05 LAB
ALBUMIN SERPL-MCNC: 3.7 GM/DL (ref 3.4–5)
ALP BLD-CCNC: 104 IU/L (ref 40–128)
ALT SERPL-CCNC: 49 U/L (ref 10–40)
ANION GAP SERPL CALCULATED.3IONS-SCNC: 9 MMOL/L (ref 4–16)
AST SERPL-CCNC: 25 IU/L (ref 15–37)
BASOPHILS ABSOLUTE: 0 K/CU MM
BASOPHILS RELATIVE PERCENT: 0.3 % (ref 0–1)
BILIRUB SERPL-MCNC: 0.5 MG/DL (ref 0–1)
BUN BLDV-MCNC: 10 MG/DL (ref 6–23)
CALCIUM SERPL-MCNC: 8.4 MG/DL (ref 8.3–10.6)
CHLORIDE BLD-SCNC: 107 MMOL/L (ref 99–110)
CO2: 22 MMOL/L (ref 21–32)
CREAT SERPL-MCNC: 0.6 MG/DL (ref 0.6–1.1)
DIFFERENTIAL TYPE: ABNORMAL
EOSINOPHILS ABSOLUTE: 0 K/CU MM
EOSINOPHILS RELATIVE PERCENT: 0.3 % (ref 0–3)
GFR AFRICAN AMERICAN: >60 ML/MIN/1.73M2
GFR NON-AFRICAN AMERICAN: >60 ML/MIN/1.73M2
GLUCOSE BLD-MCNC: 121 MG/DL (ref 70–99)
HCT VFR BLD CALC: 34.7 % (ref 37–47)
HEMOGLOBIN: 11.1 GM/DL (ref 12.5–16)
IMMATURE NEUTROPHIL %: 0.3 % (ref 0–0.43)
LYMPHOCYTES ABSOLUTE: 1.4 K/CU MM
LYMPHOCYTES RELATIVE PERCENT: 20.3 % (ref 24–44)
MCH RBC QN AUTO: 28 PG (ref 27–31)
MCHC RBC AUTO-ENTMCNC: 32 % (ref 32–36)
MCV RBC AUTO: 87.6 FL (ref 78–100)
MONOCYTES ABSOLUTE: 0.4 K/CU MM
MONOCYTES RELATIVE PERCENT: 6.4 % (ref 0–4)
NUCLEATED RBC %: 0 %
PDW BLD-RTO: 12.7 % (ref 11.7–14.9)
PLATELET # BLD: 236 K/CU MM (ref 140–440)
PMV BLD AUTO: 8.7 FL (ref 7.5–11.1)
POTASSIUM SERPL-SCNC: 3.9 MMOL/L (ref 3.5–5.1)
RBC # BLD: 3.96 M/CU MM (ref 4.2–5.4)
SEGMENTED NEUTROPHILS ABSOLUTE COUNT: 5 K/CU MM
SEGMENTED NEUTROPHILS RELATIVE PERCENT: 72.4 % (ref 36–66)
SODIUM BLD-SCNC: 138 MMOL/L (ref 135–145)
TOTAL IMMATURE NEUTOROPHIL: 0.02 K/CU MM
TOTAL NUCLEATED RBC: 0 K/CU MM
TOTAL PROTEIN: 6.2 GM/DL (ref 6.4–8.2)
WBC # BLD: 6.9 K/CU MM (ref 4–10.5)

## 2021-12-05 PROCEDURE — G0378 HOSPITAL OBSERVATION PER HR: HCPCS

## 2021-12-05 PROCEDURE — 99024 POSTOP FOLLOW-UP VISIT: CPT | Performed by: NURSE PRACTITIONER

## 2021-12-05 PROCEDURE — 36415 COLL VENOUS BLD VENIPUNCTURE: CPT

## 2021-12-05 PROCEDURE — 96372 THER/PROPH/DIAG INJ SC/IM: CPT

## 2021-12-05 PROCEDURE — 2580000003 HC RX 258: Performed by: NURSE PRACTITIONER

## 2021-12-05 PROCEDURE — 6370000000 HC RX 637 (ALT 250 FOR IP): Performed by: NURSE PRACTITIONER

## 2021-12-05 PROCEDURE — 85025 COMPLETE CBC W/AUTO DIFF WBC: CPT

## 2021-12-05 PROCEDURE — 6360000002 HC RX W HCPCS: Performed by: NURSE PRACTITIONER

## 2021-12-05 PROCEDURE — APPNB15 APP NON BILLABLE TIME 0-15 MINS: Performed by: NURSE PRACTITIONER

## 2021-12-05 PROCEDURE — 94761 N-INVAS EAR/PLS OXIMETRY MLT: CPT

## 2021-12-05 PROCEDURE — 80053 COMPREHEN METABOLIC PANEL: CPT

## 2021-12-05 RX ORDER — OXYCODONE HYDROCHLORIDE AND ACETAMINOPHEN 5; 325 MG/1; MG/1
1 TABLET ORAL EVERY 8 HOURS PRN
Qty: 15 TABLET | Refills: 0 | Status: SHIPPED | OUTPATIENT
Start: 2021-12-05 | End: 2021-12-10

## 2021-12-05 RX ORDER — ARIPIPRAZOLE 10 MG/1
30 TABLET ORAL DAILY
Status: DISCONTINUED | OUTPATIENT
Start: 2021-12-05 | End: 2021-12-05 | Stop reason: HOSPADM

## 2021-12-05 RX ORDER — QUETIAPINE FUMARATE 300 MG/1
300 TABLET, FILM COATED ORAL NIGHTLY
Status: DISCONTINUED | OUTPATIENT
Start: 2021-12-05 | End: 2021-12-05 | Stop reason: HOSPADM

## 2021-12-05 RX ORDER — ONDANSETRON 4 MG/1
4 TABLET, ORALLY DISINTEGRATING ORAL EVERY 8 HOURS PRN
Qty: 20 TABLET | Refills: 0 | Status: SHIPPED | OUTPATIENT
Start: 2021-12-05 | End: 2022-02-09

## 2021-12-05 RX ADMIN — OXYCODONE AND ACETAMINOPHEN 1 TABLET: 5; 325 TABLET ORAL at 03:22

## 2021-12-05 RX ADMIN — SODIUM CHLORIDE, PRESERVATIVE FREE 10 ML: 5 INJECTION INTRAVENOUS at 08:15

## 2021-12-05 RX ADMIN — QUETIAPINE FUMARATE 300 MG: 300 TABLET, FILM COATED ORAL at 02:34

## 2021-12-05 RX ADMIN — VORTIOXETINE 10 MG: 10 TABLET, FILM COATED ORAL at 08:17

## 2021-12-05 RX ADMIN — OXYCODONE AND ACETAMINOPHEN 1 TABLET: 5; 325 TABLET ORAL at 15:42

## 2021-12-05 RX ADMIN — OXYCODONE AND ACETAMINOPHEN 1 TABLET: 5; 325 TABLET ORAL at 08:15

## 2021-12-05 RX ADMIN — ARIPIPRAZOLE 30 MG: 10 TABLET ORAL at 08:15

## 2021-12-05 RX ADMIN — ENOXAPARIN SODIUM 40 MG: 40 INJECTION SUBCUTANEOUS at 08:15

## 2021-12-05 ASSESSMENT — PAIN DESCRIPTION - PROGRESSION: CLINICAL_PROGRESSION: NOT CHANGED

## 2021-12-05 ASSESSMENT — PAIN DESCRIPTION - ORIENTATION: ORIENTATION: MID

## 2021-12-05 ASSESSMENT — PAIN DESCRIPTION - LOCATION: LOCATION: ABDOMEN

## 2021-12-05 ASSESSMENT — PAIN - FUNCTIONAL ASSESSMENT: PAIN_FUNCTIONAL_ASSESSMENT: PREVENTS OR INTERFERES SOME ACTIVE ACTIVITIES AND ADLS

## 2021-12-05 ASSESSMENT — PAIN DESCRIPTION - FREQUENCY: FREQUENCY: CONTINUOUS

## 2021-12-05 ASSESSMENT — PAIN SCALES - GENERAL
PAINLEVEL_OUTOF10: 6
PAINLEVEL_OUTOF10: 7
PAINLEVEL_OUTOF10: 4

## 2021-12-05 ASSESSMENT — PAIN DESCRIPTION - DESCRIPTORS: DESCRIPTORS: SHARP

## 2021-12-05 ASSESSMENT — PAIN DESCRIPTION - ONSET: ONSET: ON-GOING

## 2021-12-05 NOTE — PROGRESS NOTES
Home meds returned to pt.  2 bottles of seroquel and one packet of Suvorexant. No Adderall was brought in by pt to be returned.

## 2021-12-05 NOTE — PROGRESS NOTES
4 Eyes Skin Assessment     NAME:  Felipe Salazar  YOB: 1985  MEDICAL RECORD NUMBER:  [id number]    The patient is being assess for  Admission    I agree that 2 RN's have performed a thorough Head to Toe Skin Assessment on the patient. ALL assessment sites listed below have been assessed. Areas assessed by both nurses:    Head, Face, Ears, Shoulders, Back, Chest, Arms, Elbows, Hands, Sacrum. Buttock, Coccyx, Ischium and Legs. Feet and Heels        Does the Patient have a Wound?  No noted wound(s)       Alvaro Prevention initiated:  No   Wound Care Orders initiated:  No    Pressure Injury (Stage 3,4, Unstageable, DTI, NWPT, and Complex wounds) if present place consult order under [de-identified] No    New and Established Ostomies if present place consult order under : No      Nurse 1 eSignature: Electronically signed by Shabana Demarco RN on 12/5/21 at 7:11 AM EST    **SHARE this note so that the co-signing nurse is able to place an eSignature**    Nurse 2 eSignature: {Esignature:[id number]}

## 2021-12-05 NOTE — PROGRESS NOTES
[facility]    PATIENT: Carlos Gregorio, 43 y.o., female, MRN: [id number]    Hospital Day:  LOS: 1 day , Post-Op Day: 1 Day Post-Op Laparoscopic appendectomy           Subjective:    Pain: 7/10  Diet: ADULT DIET; Regular  Activity: as tolerated    Patient was stable overnight. Chart reviewed. Patient lethargic but was able to arouse and answer questions. States was unable to sleep last night. The patient is tolerating a normal diet. Nausea negative, vomitting negative. Objective:    Vitals: /69   Pulse 78   Temp 98 °F (36.7 °C) (Oral)   Resp 16   Ht 5' 7\" (1.702 m)   Wt (!) 306 lb (138.8 kg)   LMP 07/30/2014 (Approximate)   SpO2 93%   BMI 47.93 kg/m²     I/O: 12/04 0701 - 12/05 0700  In: 900 [I.V.:900]  Out: 460 [Urine:450]    IV Fluids: sodium chloride    sodium chloride Last Rate: 50 mL/hr at 12/04/21 2049    Scheduled Meds:   ARIPiprazole, 30 mg, Oral, Daily    QUEtiapine, 300 mg, Oral, Nightly    ketamine, 70 mg, IntraVENous, Once    sodium chloride flush, 5-40 mL, IntraVENous, 2 times per day    enoxaparin, 40 mg, SubCUTAneous, Daily    amphetamine-dextroamphetamine, 30 mg, Oral, BID    VORTIoxetine, 10 mg, Oral, Daily    Physical Exam  Constitutional:       Appearance: She is well-developed. Eyes:      General: No scleral icterus. Conjunctiva/sclera: Conjunctivae normal.   Cardiovascular:      Rate and Rhythm: Normal rate and regular rhythm. Heart sounds: Normal heart sounds. No murmur heard. Pulmonary:      Effort: Pulmonary effort is normal. No respiratory distress. Breath sounds: Normal breath sounds. No wheezing. Abdominal:      Tenderness: There is abdominal tenderness. Comments: Lap sites c/d/i       Labs/Imaging Results: No results found for this or any previous visit (from the past 24 hour(s)). Assessment/Plan:  Carlos Gregorio is a 43 y.o. female who presents with abdominal pain.   She is s/p lap appendectomy    Active Problems:    Appendicitis    Class 3 drug-induced obesity with body mass index (BMI) of 45.0 to 49.9 in adult [facility])  Resolved Problems:    * No resolved hospital problems. *     Discussed findings and options with Stacey Flores. She is POD 1 for laparoscopic appendectomy   Pain: controlled    Diet: tolerating   Wound: Lap sites c/d/i   Labs/imaging: pending   Ambulation: OOB to chair, mobilize as able   Respiratory:  IS at bedside, encourage hourly IS and deep breathing   OK for discharge from surgical standpoint.     OLY Tarango-CNP  12/5/2021  8:25 AM

## 2021-12-05 NOTE — DISCHARGE SUMMARY
HOSPITALIST DISCHARGE SUMMARY     Patient ID: Shalini Robbins 1985                 [id number]      PCP:  Alyssa Dotson MD    Admit date: 12/3/2021   Discharge date: 12/5/2021      Admitting Physician: [facility],   Attending Physician(s): Jacquie Simental MD, MD;    Discharge Physician: Jacquie Simental MD, MD.    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 *    Discharged Condition: stable  Disposition: home    Procedures: Laparoscopic appendectomy by general surgeon Dr. Zac Lowery  Complications: None documented    Brief History: Shalini Robbins is a 43 y.o.  female  who presents with periumbilical abdominal pain which started yesterday and described as stabbing pain, 10/10 in severity and radiates to her renal angles bilaterally. She reports associated nausea but no vomiting and also has soft stools. She denies any dysuria, frequency of urination or hematuria. She also reports associated chills and cold sweats but no objective fever. Patient denies any orthopnea, PND or leg swelling. She also denies any chest pain, palpitation, dizziness or syncope. She denies use of anticoagulant and no herbal medications. She also denies any issues with anesthesia in the past or any family history of issues with anesthesia. Patient denies any liver, kidney or heart disease. No history of diabetes. With the worsening of the abdominal pain, she presented to the ER to be evaluated. Hospital Course: Did as above, work-up at the ED confirming appendicitis, seen by general surgery and had laparoscopic appendectomy. Patient was maintained in-house overnight, diet slowly advanced with patient tolerating her p.o. intake and passing gas and having bowel movement. Patient cleared for discharge home this morning with a follow-up visits with them in 2 weeks. Vital signs and heart rate are within limits of normal.  Being discharged home in a stable state. Prognosis: Good    Physical Examination: /69   Pulse 78   Temp 98 °F (36.7 °C) (Oral)   Resp 16   Ht 5' 7\" (1.702 m)   Wt (!) 306 lb (138.8 kg)   LMP 07/30/2014 (Approximate)   SpO2 93%   BMI 47.93 kg/m²   General appearance: alert, appears stated age and cooperative  Head: Normocephalic, without obvious abnormality, atraumatic  Eyes: conjunctivae/corneas clear. PERRL, EOM's intact. Fundi benign. Back: symmetric, no curvature. ROM normal. No CVA tenderness.   Lungs: clear to auscultation bilaterally  Heart: regular rate and rhythm, S1, S2 normal, no murmur, click, rub or gallop  Abdomen: soft, non-tender; bowel sounds normal; no masses,  no organomegaly  Extremities: extremities normal, atraumatic, no cyanosis or edema  Pulses: 2+ and symmetric  Neurologic: Grossly normal    Significant Diagnostic Studies: None  Pending Investigation/labs: None    Recent Labs:  CBC with Differential:    Lab Results   Component Value Date    WBC 6.9 12/05/2021    RBC 3.96 12/05/2021    HGB 11.1 12/05/2021    HCT 34.7 12/05/2021     12/05/2021    MCV 87.6 12/05/2021    MCH 28.0 12/05/2021    MCHC 32.0 12/05/2021    RDW 12.7 12/05/2021    SEGSPCT 72.4 12/05/2021    LYMPHOPCT 20.3 12/05/2021    MONOPCT 6.4 12/05/2021    EOSPCT 4 02/15/2018    BASOPCT 0.3 12/05/2021    MONOSABS 0.4 12/05/2021    LYMPHSABS 1.4 12/05/2021    EOSABS 0.0 12/05/2021    BASOSABS 0.0 12/05/2021    DIFFTYPE AUTOMATED DIFFERENTIAL 12/05/2021     CMP:    Lab Results   Component Value Date     12/05/2021    K 3.9 12/05/2021     12/05/2021    CO2 22 12/05/2021    BUN 10 12/05/2021    CREATININE capsule  Commonly known as: MINIPRESS           Where to Get Your Medications      These medications [facility] to [id number], [address] [place] [address] [phone]  [address] [place] [address], [address] [place] [address]    Phone: [phone]   · ondansetron 4 MG disintegrating tablet  · oxyCODONE-acetaminophen 5-325 MG per tablet         Activity: activity as tolerated  Diet: regular diet  Wound Care: keep wound clean and dry and as directed    Follow-up with:   Yulissa Wood MD  [address] [id number] [phone]    In 2 weeks      David Baeza MD  [address] [facility]  [id number] [place] [address]  [phone]    In 4 weeks      Services:   None    Electronically Signed by: Angel Dale MD, MD.    Time spent on discharge/dictation: 35 minutes

## 2021-12-16 NOTE — PROGRESS NOTES
Physician Progress Note      PATIENT:               Oscar Denny  CSN #:                  [id number]  :                       1985  ADMIT DATE:       12/3/2021 10:55 PM  [address] DATE:        2021 6:05 PM  RESPONDING  PROVIDER #:        Leida Dillard MD          QUERY TEXT:    Pt admitted with acute appendicitis. Noted documentation of hyponatremia on   21 by ED provider, LYNETTE Sim. If possible, please document in   progress notes and discharge summary:    The medical record reflects the following:  Risk Factors: vomiting and diarrhea  Clinical Indicators: Initial Na was 131 on 12/3/21. After receiving IVF and   tolerating PO fluids, Na was 138. Treatment: IVF, antiemetics, appendectomy to resolve issue causing n/v/d    Thank you,  Ling Arteaga RN  [phone]  Options provided:  -- Hyponatremia confirmed present on admission  -- Hyponatremia ruled out  -- Other - I will add my own diagnosis  -- Disagree - Not applicable / Not valid  -- Disagree - Clinically unable to determine / Unknown  -- Refer to Clinical Documentation Reviewer    PROVIDER RESPONSE TEXT:    Provider disagreed with this query.     Query created by: Vic Peace on 12/15/2021 1:20 PM      Electronically signed by:  Leida Dillard MD 2021 7:48 AM

## 2022-02-09 ENCOUNTER — OFFICE VISIT (OUTPATIENT)
Dept: FAMILY MEDICINE CLINIC | Age: 37
End: 2022-02-09
Payer: COMMERCIAL

## 2022-02-09 VITALS
SYSTOLIC BLOOD PRESSURE: 124 MMHG | WEIGHT: 293 LBS | HEIGHT: 67 IN | BODY MASS INDEX: 45.99 KG/M2 | OXYGEN SATURATION: 97 % | HEART RATE: 105 BPM | DIASTOLIC BLOOD PRESSURE: 84 MMHG

## 2022-02-09 DIAGNOSIS — F41.9 ANXIETY AND DEPRESSION: Primary | ICD-10-CM

## 2022-02-09 DIAGNOSIS — F51.01 PRIMARY INSOMNIA: ICD-10-CM

## 2022-02-09 DIAGNOSIS — E66.01 MORBID OBESITY (HCC): ICD-10-CM

## 2022-02-09 DIAGNOSIS — M54.42 MIDLINE LOW BACK PAIN WITH BILATERAL SCIATICA, UNSPECIFIED CHRONICITY: ICD-10-CM

## 2022-02-09 DIAGNOSIS — M54.41 MIDLINE LOW BACK PAIN WITH BILATERAL SCIATICA, UNSPECIFIED CHRONICITY: ICD-10-CM

## 2022-02-09 DIAGNOSIS — F32.A ANXIETY AND DEPRESSION: Primary | ICD-10-CM

## 2022-02-09 PROCEDURE — G8427 DOCREV CUR MEDS BY ELIG CLIN: HCPCS | Performed by: FAMILY MEDICINE

## 2022-02-09 PROCEDURE — G8417 CALC BMI ABV UP PARAM F/U: HCPCS | Performed by: FAMILY MEDICINE

## 2022-02-09 PROCEDURE — 1036F TOBACCO NON-USER: CPT | Performed by: FAMILY MEDICINE

## 2022-02-09 PROCEDURE — G8484 FLU IMMUNIZE NO ADMIN: HCPCS | Performed by: FAMILY MEDICINE

## 2022-02-09 PROCEDURE — 99204 OFFICE O/P NEW MOD 45 MIN: CPT | Performed by: FAMILY MEDICINE

## 2022-02-09 RX ORDER — BUPRENORPHINE HYDROCHLORIDE AND NALOXONE HYDROCHLORIDE DIHYDRATE 8; 2 MG/1; MG/1
1 TABLET SUBLINGUAL 2 TIMES DAILY PRN
Qty: 58 TABLET | Refills: 0 | Status: SHIPPED | OUTPATIENT
Start: 2022-02-09 | End: 2022-03-11

## 2022-02-09 ASSESSMENT — PATIENT HEALTH QUESTIONNAIRE - PHQ9
SUM OF ALL RESPONSES TO PHQ QUESTIONS 1-9: 17
SUM OF ALL RESPONSES TO PHQ9 QUESTIONS 1 & 2: 5
6. FEELING BAD ABOUT YOURSELF - OR THAT YOU ARE A FAILURE OR HAVE LET YOURSELF OR YOUR FAMILY DOWN: 2
9. THOUGHTS THAT YOU WOULD BE BETTER OFF DEAD, OR OF HURTING YOURSELF: 0
7. TROUBLE CONCENTRATING ON THINGS, SUCH AS READING THE NEWSPAPER OR WATCHING TELEVISION: 3
3. TROUBLE FALLING OR STAYING ASLEEP: 2
SUM OF ALL RESPONSES TO PHQ QUESTIONS 1-9: 17
4. FEELING TIRED OR HAVING LITTLE ENERGY: 2
10. IF YOU CHECKED OFF ANY PROBLEMS, HOW DIFFICULT HAVE THESE PROBLEMS MADE IT FOR YOU TO DO YOUR WORK, TAKE CARE OF THINGS AT HOME, OR GET ALONG WITH OTHER PEOPLE: 3
SUM OF ALL RESPONSES TO PHQ QUESTIONS 1-9: 17
2. FEELING DOWN, DEPRESSED OR HOPELESS: 2
5. POOR APPETITE OR OVEREATING: 2
SUM OF ALL RESPONSES TO PHQ QUESTIONS 1-9: 17
8. MOVING OR SPEAKING SO SLOWLY THAT OTHER PEOPLE COULD HAVE NOTICED. OR THE OPPOSITE, BEING SO FIGETY OR RESTLESS THAT YOU HAVE BEEN MOVING AROUND A LOT MORE THAN USUAL: 1
1. LITTLE INTEREST OR PLEASURE IN DOING THINGS: 3

## 2022-02-09 NOTE — PROGRESS NOTES
Sunithasamreen  1985 02/12/22    Chief Complaint   Patient presents with    New Patient           42 yrs old lady with PMH of chronic back pain, chronic knee pain, anxiety and depression, f/u with the pervious PCP, take pain medication,  valium, adderal, and belsamra, patient asking for pain medication. And asking for all her medication, dose not see the pain clinic and dose not see the psychatrist.       Past Medical History:   Diagnosis Date    Acid reflux     Anxiety     Arthritis     \"Knees\"    Chronic back pain     Chronic knee pain     COPD (chronic obstructive pulmonary disease) ([facility])     Sees Dr. Alonso Model Depression     FHx: coronary artery disease     H/O 24 hour EKG monitoring 5/25/14    24 hr holter monitor. Sinus rhythm.  H/O cardiovascular stress test 06/02/2014    EF 64%, no ischemia, no wall motion abnormality seen, normal perfusion pattern.  H/O echocardiogram 6/02/14 6/14 EF-55% left atrium is mildly dilated.     Hiatal hernia     IBS (irritable bowel syndrome)     Morbid obesity ([facility])     Night terrors     Osteoarthritis     \"Knees\"    Panic attacks     PTSD (post-traumatic stress disorder)     SVT (supraventricular tachycardia) ([facility] [address] 05/2014    \"saw heart dr had stress test, 48 hr holter and echo and all turned out fine\"    Teeth missing     Upper    Wears glasses     Wears partial dentures     Upper     Past Surgical History:   Procedure Laterality Date    CHOLECYSTECTOMY, LAPAROSCOPIC  2015    COLONOSCOPY  2015    DENTAL SURGERY      Teeth Extracted In Past    DILATION AND CURETTAGE OF UTERUS  2011    ENDOSCOPY, COLON, DIAGNOSTIC  2017    EYE SURGERY Bilateral 1995    \"Tightened The Muscles, I Was Cross Eyed\"    HYSTERECTOMY VAGINAL  2000's    LAPAROSCOPIC APPENDECTOMY N/A 12/4/2021    APPENDECTOMY LAPAROSCOPIC performed by Carolin Sandra MD at [address] [place] [address]  04/02/2018    Laparoscopic and Robotic with hiatal hernia repair    TONSILLECTOMY  2016     Family History   Problem Relation Age of Onset   Steven Porch Depression Mother     Alcohol Abuse Father     Substance Abuse Father         Alcoholisim    High Blood Pressure Father     Bipolar Disorder Sister     Depression Sister     Substance Abuse Sister         \"Marijuana\"     Social History     Socioeconomic History    Marital status: Single     Spouse name: Not on file    Number of children: Not on file    Years of education: Not on file    Highest education level: Not on file   Occupational History    Not on file   Tobacco Use    Smoking status: Former Smoker     Packs/day: 0.50     Years: 20.00     Pack years: 10.00     Types: Cigarettes     Start date:      Quit date: 2017     Years since quittin.4    Smokeless tobacco: Never Used   Vaping Use    Vaping Use: Every day    Substances: Always   Substance and Sexual Activity    Alcohol use: Yes     Alcohol/week: 0.0 standard drinks     Comment: \"Very Rarely, Maybe Once A Year\"    Drug use: No    Sexual activity: Not Currently   Other Topics Concern    Not on file   Social History Narrative    Not on file     Social Determinants of Health     Financial Resource Strain:     Difficulty of Paying Living Expenses: Not on file   Food Insecurity:     Worried About Running Out of Food in the Last Year: Not on file    Kareem of Food in the Last Year: Not on file   Transportation Needs:     Lack of Transportation (Medical): Not on file    Lack of Transportation (Non-Medical):  Not on file   Physical Activity:     Days of Exercise per Week: Not on file    Minutes of Exercise per Session: Not on file   Stress:     Feeling of Stress : Not on file   Social Connections:     Frequency of Communication with Friends and Family: Not on file    Frequency of Social Gatherings with Friends and Family: Not on file    Attends Rastafari Services: Not on file    Active Member of Clubs or Organizations: Not on file  Attends Club or Organization Meetings: Not on file    Marital Status: Not on file   Intimate Partner Violence:     Fear of Current or Ex-Partner: Not on file    Emotionally Abused: Not on file    Physically Abused: Not on file    Sexually Abused: Not on file   Housing Stability:     Unable to Pay for Housing in the Last Year: Not on file    Number of Jose Luis in the Last Year: Not on file    Unstable Housing in the Last Year: Not on file       Allergies   Allergen Reactions    Morphine Itching and Nausea And Vomiting    Phenergan [Promethazine Hcl] Shortness Of Breath     Current Outpatient Medications   Medication Sig Dispense Refill    Suvorexant 20 MG TABS Take 1 tablet by mouth nightly for 30 days. 30 tablet 0    buprenorphine-naloxone (SUBOXONE) 8-2 MG SUBL SL tablet Place 1 tablet under the tongue 2 times daily as needed (Knee pain) for up to 30 days. 58 tablet 0    QUEtiapine (SEROQUEL) 300 MG tablet Take 300 mg by mouth nightly 2 tabs at bedtime.  QUEtiapine (SEROQUEL) 100 MG tablet Take 100 mg by mouth nightly as needed (1-3 tabs for insomia)      amphetamine-dextroamphetamine (ADDERALL, 30MG,) 30 MG tablet Take 30 mg by mouth 2 times daily. Patient takes 1.5 tabs  Daily.  VORTIoxetine (TRINTELLIX) 10 MG TABS tablet Take 10 mg by mouth daily      naloxegol (MOVANTIK) 25 MG TABS tablet Take 25 mg by mouth every morning      diazepam (VALIUM) 10 MG tablet Take 5 mg by mouth as needed for Anxiety.  ARIPiprazole (ABILIFY) 30 MG tablet Take 30 mg by mouth nightly Patient takes this in the morning. No current facility-administered medications for this visit. Review of Systems   Constitutional: Positive for activity change and fatigue. Negative for appetite change, chills and fever. HENT: Negative for congestion, postnasal drip, sinus pressure and sore throat. Respiratory: Negative for cough, shortness of breath and wheezing.     Cardiovascular: Negative for chest pain and leg swelling. Gastrointestinal: Negative for abdominal pain, constipation and diarrhea. Genitourinary: Negative for dysuria and frequency. Musculoskeletal: Positive for arthralgias (left knee pain), back pain and gait problem (use cane). Skin: Negative for rash. Neurological: Negative for dizziness and headaches. Psychiatric/Behavioral: Negative for agitation, behavioral problems and sleep disturbance. The patient is not nervous/anxious.         Lab Results   Component Value Date    WBC 6.9 12/05/2021    HGB 11.1 (L) 12/05/2021    HCT 34.7 (L) 12/05/2021    MCV 87.6 12/05/2021     12/05/2021     Lab Results   Component Value Date     12/05/2021    K 3.9 12/05/2021     12/05/2021    CO2 22 12/05/2021    BUN 10 12/05/2021    CREATININE 0.6 12/05/2021    GLUCOSE 121 (H) 12/05/2021    CALCIUM 8.4 12/05/2021    PROT 6.2 (L) 12/05/2021    LABALBU 3.7 12/05/2021    BILITOT 0.5 12/05/2021    ALKPHOS 104 12/05/2021    AST 25 12/05/2021    ALT 49 (H) 12/05/2021    LABGLOM >60 12/05/2021    GFRAA >60 12/05/2021     Lab Results   Component Value Date    CHOL 179 02/15/2018     Lab Results   Component Value Date    TRIG 138 02/15/2018     Lab Results   Component Value Date    HDL 40 02/15/2018     Lab Results   Component Value Date    LDLCALC 111 02/15/2018     Lab Results   Component Value Date    LABA1C 4.7 02/15/2018     Lab Results   Component Value Date    TSH 2.470 02/15/2018    TSHHS 4.333 12/27/2013         /84 (Site: Left Upper Arm, Position: Sitting, Cuff Size: Medium Adult)   Pulse 105   Ht 5' 7\" (1.702 m)   Wt (!) 321 lb (145.6 kg)   LMP 07/30/2014 (Approximate)   SpO2 97%   BMI 50.28 kg/m²     BP Readings from Last 3 Encounters:   02/09/22 124/84   12/05/21 127/83   12/04/21 (!) 149/91       Wt Readings from Last 3 Encounters:   02/09/22 (!) 321 lb (145.6 kg)   12/04/21 (!) 306 lb (138.8 kg)   02/22/20 260 lb (117.9 kg)         Physical Exam  Constitutional: General: She is not in acute distress. Appearance: She is well-developed. She is obese. She is not diaphoretic. Comments: morbid obes   HENT:      Head: Normocephalic and atraumatic. Eyes:      General: No scleral icterus. Pupils: Pupils are equal, round, and reactive to light. Cardiovascular:      Rate and Rhythm: Normal rate and regular rhythm. Heart sounds: Normal heart sounds. No murmur heard. Pulmonary:      Effort: Pulmonary effort is normal.      Breath sounds: Normal breath sounds. No wheezing. Abdominal:      General: There is no distension. Palpations: There is no mass. Tenderness: There is no abdominal tenderness. Musculoskeletal:         General: Normal range of motion. Cervical back: Normal range of motion and neck supple. No rigidity. Right lower leg: No edema. Left lower leg: No edema. Neurological:      General: No focal deficit present. Mental Status: She is alert and oriented to person, place, and time. Psychiatric:         Mood and Affect: Mood normal.         Behavior: Behavior normal.         ASSESSMENT/ PLAN:    1. Anxiety and depression  - on medication. 2. Primary insomnia  - will give her the medication just one month  - Suvorexant 20 MG TABS; Take 1 tablet by mouth nightly for 30 days. Dispense: 30 tablet; Refill: 0  - External Referral To Psychiatry    3. Midline low back pain with bilateral sciatica, unspecified chronicity  - on pain medication will give just this month, will refere her to   - Amb External Referral To Pain Clinic  - buprenorphine-naloxone (SUBOXONE) 8-2 MG SUBL SL tablet; Place 1 tablet under the tongue 2 times daily as needed (Knee pain) for up to 30 days. Dispense: 58 tablet; Refill: 0    4. Morbid obesity ([address]  - encourage to loss wt              - All old blood work reviewed with the patient  - Appropriate prescription are addressed. - After visit summery provided.   - Questions answered and patient verbalizes understanding.  - Call for any problem, questions, or concerns. Return in about 3 months (around 5/9/2022).

## 2022-02-12 PROBLEM — F51.01 PRIMARY INSOMNIA: Status: ACTIVE | Noted: 2022-02-12

## 2022-02-12 ASSESSMENT — ENCOUNTER SYMPTOMS
COUGH: 0
WHEEZING: 0
ABDOMINAL PAIN: 0
DIARRHEA: 0
SINUS PRESSURE: 0
SHORTNESS OF BREATH: 0
SORE THROAT: 0
BACK PAIN: 1
CONSTIPATION: 0

## 2022-02-18 ENCOUNTER — TELEPHONE (OUTPATIENT)
Dept: FAMILY MEDICINE CLINIC | Age: 37
End: 2022-02-18

## 2022-03-07 ENCOUNTER — TELEPHONE (OUTPATIENT)
Dept: FAMILY MEDICINE CLINIC | Age: 37
End: 2022-03-07

## 2022-03-07 NOTE — TELEPHONE ENCOUNTER
[facility] called to state that they are out of patients network for their insurance. However, [facility] does accept patients insurance. They have an office on [address] as well as [facility]. The best way for patient to be contacted and scheduled is to sent their information to the [facility] office since [facility] is always having issues with their fax.      [facility]  P: [phone]  F: [phone]

## 2022-03-14 ENCOUNTER — TELEPHONE (OUTPATIENT)
Dept: FAMILY MEDICINE CLINIC | Age: 37
End: 2022-03-14

## 2022-04-01 DIAGNOSIS — M54.41 MIDLINE LOW BACK PAIN WITH BILATERAL SCIATICA, UNSPECIFIED CHRONICITY: ICD-10-CM

## 2022-04-01 DIAGNOSIS — F51.01 PRIMARY INSOMNIA: ICD-10-CM

## 2022-04-01 DIAGNOSIS — M54.42 MIDLINE LOW BACK PAIN WITH BILATERAL SCIATICA, UNSPECIFIED CHRONICITY: ICD-10-CM

## 2022-04-04 RX ORDER — BUPRENORPHINE HYDROCHLORIDE AND NALOXONE HYDROCHLORIDE DIHYDRATE 8; 2 MG/1; MG/1
1 TABLET SUBLINGUAL 2 TIMES DAILY PRN
Qty: 58 TABLET | Refills: 0 | OUTPATIENT
Start: 2022-04-04 | End: 2022-05-04

## 2022-04-04 RX ORDER — SUVOREXANT 20 MG/1
TABLET, FILM COATED ORAL
Qty: 30 TABLET | Refills: 2 | Status: SHIPPED | OUTPATIENT
Start: 2022-04-04 | End: 2022-05-04

## 2022-11-10 NOTE — PROGRESS NOTES
BARIATRIC SURGERY OFFICE NOTE    SUBJECTIVE:    Patient presenting today referred from Caryl Vee MD, for   Chief Complaint   Patient presents with    Follow-up     here for 6th bariartric visit, diet, exercise, and pre surgical weight loss. .    Vitals:    12/11/17 1407   BP: 138/88   Pulse: 72   SpO2: 98%        BMI: Body mass index is 49.62 kg/m². Obesity Classification: III Morbid Obesity. Weight History: Wt Readings from Last 3 Encounters:   12/11/17 (!) 316 lb 12.8 oz (143.7 kg)   10/26/17 (!) 318 lb (144.2 kg)   10/25/17 (!) 319 lb (144.7 kg)     Weight History: Wt Readings from Last 3 Encounters:   12/11/17 (!) 316 lb 12.8 oz (143.7 kg)   10/26/17 (!) 318 lb (144.2 kg)   10/25/17 (!) 319 lb (144.7 kg)     Total weight loss/gain 2.2 Lbs over 1 month. Patient dines out to a sit down restaurant 0 times per month. Patient eats fast food meals 0 times per month. Drinks mostly diet ice tea or water    24 hour recall/food frequency chart:  Breakfast: egg sandwich   Snack: none   Lunch: none   Snack: none   Dinner: spegetti   Snack: none     Total daily calories: 900-1200      Exercises not much  0 min 1       HPI: Jessi Quigley is a 27 y.o. female presenting in sixth bariatric visit, follow up diet and exercise - pre-operative weight loss, in consideration for bariatric surgery. Total weight loss/gain -5.1 Lbs over one month. She has completed her required clearances.   Will need to complete lab work (copies of lab orders given to take to [place] to complete)  She is motivated for weight loss and abiding with the 1200 calorie diet  Her household has been stressful with her sister and her three children moving in with her  She battles with stress eating and trying to choose healthy alternatives like fruit or air popcorn  Smoked two cigarettes yesterday but prior to that had quit for 32 days  She drinks mostly water and diet tea  Difficulty with exercise due to knee pain PERLA called pt's son Gretel ([phone]) to verify pt will have assistance upon returning home which he verified his wife and other family members will be present to receive pt. especially her left knee (patella is tilted)  Reviewed her EGD- stomach biopsies revealed H. Pylori will treat. Patient to stop Celexa while taking Clarithromycin (discussed risk for QT prolongation) she verbalized understanding and will stop during treatment for H. Pylori. Thoroughly reviewed the patient's medical history, family history, social history and review of systems with the patient today in the office. Please see medical record for pertinent positives. Past Medical History:   Diagnosis Date    Arthritis     bilat. knees    Chest pain 05/2014    \"no chest pain since 2014- they think the chest pain was related to pain I was having\"    Depressed 5/5/2016    FHx: coronary artery disease     H/O 24 hour EKG monitoring 5/25/14    24 hr holter monitor. Sinus rhythm.  H/O cardiovascular stress test 06/02/2014    EF 64%, no ischemia, no wall motion abnormality seen, normal perfusion pattern.  H/O echocardiogram 6/02/14 6/14 EF-55% left atrium is mildly dilated.     IBS (irritable bowel syndrome)     dx 2015    Morbid obesity ([facility])     Osteoarthritis     Palpitations 5/14    PTSD (post-traumatic stress disorder)     Severe anxiety     SOBOE (shortness of breath on exertion) 5/14    Stomach cramps, generalized     SVT (supraventricular tachycardia) ([address] 05/2014    \"saw heart dr had stress test, 48 hr holter and echo and all turned out fine\"      Patient Active Problem List   Diagnosis    Internal derangement of right knee    Morbid obesity ([address]    FHx: coronary artery disease    SVT (supraventricular tachycardia) ([facility])    Chest pain    SOBOE (shortness of breath on exertion)    Palpitations    H/O cardiovascular stress test    Snores    Fatty liver    Gastroesophageal reflux disease without esophagitis    Chronic constipation    SOB (shortness of breath) on exertion    Depressed    Anxiety    Panic    Polycystic disease, ovaries    Bilateral low back pain    Left Provider made aware. Recommended to come and see the patient at bedside Provider made aware. Ordered STAT labs & EKG at this time. Will administer meds as ordered Provider made aware. No new orders at this time. Prescribe blood pressure medication to reduce blood pressure. Provider made aware

## (undated) DEVICE — SET TBNG DISP TIP FOR AHTO

## (undated) DEVICE — TROCAR: Brand: KII FIOS FIRST ENTRY

## (undated) DEVICE — GLOVE ORANGE PI 7   MSG9070

## (undated) DEVICE — CUTTER ENDOSCP L340MM LIN ARTC SGL STROKE FIRING ENDOPATH

## (undated) DEVICE — GLOVE SURG SZ 65 THK91MIL LTX FREE SYN POLYISOPRENE

## (undated) DEVICE — GOWN,SIRUS,POLYRNF,BRTHSLV,XLN/XL,20/CS: Brand: MEDLINE

## (undated) DEVICE — RELOAD STPL SZ 0 L45MM DIA3.5MM 0DEG STD REG TISS BLU TI

## (undated) DEVICE — SYRINGE 20ML LL S/C 50

## (undated) DEVICE — GLOVE SURG SZ 65 L12IN FNGR THK79MIL GRN LTX FREE

## (undated) DEVICE — APPLICATOR MEDICATED 26 CC SOLUTION HI LT ORNG CHLORAPREP

## (undated) DEVICE — SUTURE SZ 0 27IN 5/8 CIR UR-6  TAPER PT VIOLET ABSRB VICRYL J603H

## (undated) DEVICE — SUTURE COAT VCRL SZ 4-0 L18IN ABSRB UD L19MM PS-2 1/2 CIR J496G

## (undated) DEVICE — NEEDLE INSUF L120MM DIA2MM DISP FOR PNEUMOPERI ENDOPATH

## (undated) DEVICE — BAG SPEC REM 224ML W4XL6IN DIA10MM 1 HND GYN DISP ENDOPCH

## (undated) DEVICE — TOTAL TRAY, DB, 100% SILI FOLEY, 16FR 10: Brand: MEDLINE

## (undated) DEVICE — PACK SURG LAP CHOLE

## (undated) DEVICE — TOWEL,OR,DSP,ST,BLUE,STD,6/PK,12PK/CS: Brand: MEDLINE

## (undated) DEVICE — GOWN,ECLIPSE,POLYRNF,BRTHSLV,L,30/CS: Brand: MEDLINE

## (undated) DEVICE — Z DUPLICATE USE 2431315 SET INSUF TBNG HI FLO W/ SMK EVAC FOR PNEUMOCLEAR

## (undated) DEVICE — GLOVE SURG SZ 6 CRM LTX FREE POLYISOPRENE POLYMER BEAD ANTI

## (undated) DEVICE — Device

## (undated) DEVICE — ELECTRODE ES AD CRDLSS PT RET REM POLYHESIVE

## (undated) DEVICE — GLOVE SURG SZ 7 L12IN FNGR THK79MIL GRN LTX FREE

## (undated) DEVICE — APPLIER CLP M/L SHFT DIA5MM 15 LIG LIGAMAX 5

## (undated) DEVICE — RELOAD STPL H1-2.5X45MM VASC THN TISS WHT 6 ROW B FRM SGL